# Patient Record
Sex: MALE | Race: WHITE | Employment: OTHER | URBAN - METROPOLITAN AREA
[De-identification: names, ages, dates, MRNs, and addresses within clinical notes are randomized per-mention and may not be internally consistent; named-entity substitution may affect disease eponyms.]

---

## 2017-02-09 ENCOUNTER — HOSPITAL ENCOUNTER (OUTPATIENT)
Dept: MRI IMAGING | Age: 72
Discharge: HOME | End: 2017-02-09
Attending: NEUROLOGICAL SURGERY
Payer: MEDICARE

## 2017-02-09 DIAGNOSIS — G95.9 CERVICAL MYELOPATHY WITH CERVICAL RADICULOPATHY (HCC): ICD-10-CM

## 2017-02-09 DIAGNOSIS — M54.12 CERVICAL MYELOPATHY WITH CERVICAL RADICULOPATHY (HCC): ICD-10-CM

## 2017-02-09 PROCEDURE — 72141 MRI NECK SPINE W/O DYE: CPT

## 2017-02-13 ENCOUNTER — HOSPITAL ENCOUNTER (OUTPATIENT)
Dept: GENERAL RADIOLOGY | Age: 72
Discharge: HOME | End: 2017-02-13
Payer: MEDICARE

## 2017-02-13 DIAGNOSIS — M06.09 RHEUMATOID ARTHRITIS OF MULTIPLE SITES WITHOUT RHEUMATOID FACTOR (HCC): ICD-10-CM

## 2017-02-13 PROCEDURE — 73130 X-RAY EXAM OF HAND: CPT

## 2018-02-21 ENCOUNTER — HOSPITAL ENCOUNTER (OUTPATIENT)
Dept: CT IMAGING | Age: 73
Discharge: HOME | End: 2018-02-21
Attending: OTOLARYNGOLOGY
Payer: MEDICARE

## 2018-02-21 DIAGNOSIS — J32.9 CHRONIC SINUSITIS: ICD-10-CM

## 2018-02-21 PROCEDURE — 70486 CT MAXILLOFACIAL W/O DYE: CPT

## 2019-03-15 ENCOUNTER — HOSPITAL ENCOUNTER (OUTPATIENT)
Dept: CT IMAGING | Age: 74
Discharge: HOME | End: 2019-03-15
Attending: OPHTHALMOLOGY
Payer: MEDICARE

## 2019-03-15 DIAGNOSIS — H05.89 ORBITAL MASS: ICD-10-CM

## 2019-03-15 LAB — CREAT BLD-MCNC: 0.8 MG/DL (ref 0.6–1.3)

## 2019-03-15 PROCEDURE — 70482 CT ORBIT/EAR/FOSSA W/O&W/DYE: CPT

## 2019-03-15 PROCEDURE — 74011636320 HC RX REV CODE- 636/320: Performed by: RADIOLOGY

## 2019-03-15 PROCEDURE — 82565 ASSAY OF CREATININE: CPT

## 2019-03-15 RX ORDER — SODIUM CHLORIDE 0.9 % (FLUSH) 0.9 %
10 SYRINGE (ML) INJECTION
Status: COMPLETED | OUTPATIENT
Start: 2019-03-15 | End: 2019-03-15

## 2019-03-15 RX ADMIN — IOPAMIDOL 100 ML: 755 INJECTION, SOLUTION INTRAVENOUS at 13:23

## 2019-03-15 RX ADMIN — Medication 10 ML: at 13:23

## 2020-07-14 ENCOUNTER — APPOINTMENT (OUTPATIENT)
Dept: GENERAL RADIOLOGY | Age: 75
DRG: 310 | End: 2020-07-14
Attending: EMERGENCY MEDICINE
Payer: MEDICARE

## 2020-07-14 ENCOUNTER — APPOINTMENT (OUTPATIENT)
Dept: CT IMAGING | Age: 75
DRG: 310 | End: 2020-07-14
Attending: EMERGENCY MEDICINE
Payer: MEDICARE

## 2020-07-14 ENCOUNTER — HOSPITAL ENCOUNTER (INPATIENT)
Age: 75
LOS: 1 days | Discharge: HOME | DRG: 310 | End: 2020-07-17
Attending: EMERGENCY MEDICINE | Admitting: HOSPITALIST
Payer: MEDICARE

## 2020-07-14 DIAGNOSIS — I48.91 ATRIAL FIBRILLATION, UNSPECIFIED TYPE (HCC): ICD-10-CM

## 2020-07-14 DIAGNOSIS — I95.9 HYPOTENSION, UNSPECIFIED HYPOTENSION TYPE: ICD-10-CM

## 2020-07-14 DIAGNOSIS — F10.929 ALCOHOLIC INTOXICATION WITH COMPLICATION (HCC): ICD-10-CM

## 2020-07-14 DIAGNOSIS — E86.0 DEHYDRATION: ICD-10-CM

## 2020-07-14 DIAGNOSIS — R55 SYNCOPE AND COLLAPSE: Primary | ICD-10-CM

## 2020-07-14 LAB
ALBUMIN SERPL-MCNC: 2.5 G/DL (ref 3.5–5)
ALBUMIN/GLOB SERPL: 1 {RATIO} (ref 1.1–2.2)
ALP SERPL-CCNC: 80 U/L (ref 45–117)
ALT SERPL-CCNC: 19 U/L (ref 12–78)
ANION GAP SERPL CALC-SCNC: 8 MMOL/L (ref 5–15)
APTT PPP: 28.8 SEC (ref 22.1–32)
AST SERPL-CCNC: 14 U/L (ref 15–37)
BASOPHILS # BLD: 0 K/UL (ref 0–0.1)
BASOPHILS NFR BLD: 1 % (ref 0–1)
BILIRUB SERPL-MCNC: 0.2 MG/DL (ref 0.2–1)
BUN SERPL-MCNC: 15 MG/DL (ref 6–20)
BUN/CREAT SERPL: 20 (ref 12–20)
CALCIUM SERPL-MCNC: 6.9 MG/DL (ref 8.5–10.1)
CHLORIDE SERPL-SCNC: 109 MMOL/L (ref 97–108)
CK MB CFR SERPL CALC: 2.1 % (ref 0–2.5)
CK MB SERPL-MCNC: 1.2 NG/ML (ref 5–25)
CK SERPL-CCNC: 58 U/L (ref 39–308)
CO2 SERPL-SCNC: 23 MMOL/L (ref 21–32)
COMMENT, HOLDF: NORMAL
CREAT SERPL-MCNC: 0.74 MG/DL (ref 0.7–1.3)
DIFFERENTIAL METHOD BLD: ABNORMAL
EOSINOPHIL # BLD: 0.1 K/UL (ref 0–0.4)
EOSINOPHIL NFR BLD: 2 % (ref 0–7)
ERYTHROCYTE [DISTWIDTH] IN BLOOD BY AUTOMATED COUNT: 11.8 % (ref 11.5–14.5)
ETHANOL SERPL-MCNC: 89 MG/DL
GLOBULIN SER CALC-MCNC: 2.6 G/DL (ref 2–4)
GLUCOSE SERPL-MCNC: 90 MG/DL (ref 65–100)
HCT VFR BLD AUTO: 34.1 % (ref 36.6–50.3)
HGB BLD-MCNC: 11.8 G/DL (ref 12.1–17)
IMM GRANULOCYTES # BLD AUTO: 0 K/UL (ref 0–0.04)
IMM GRANULOCYTES NFR BLD AUTO: 0 % (ref 0–0.5)
INR PPP: 1.2 (ref 0.9–1.1)
LYMPHOCYTES # BLD: 1.3 K/UL (ref 0.8–3.5)
LYMPHOCYTES NFR BLD: 26 % (ref 12–49)
MAGNESIUM SERPL-MCNC: 1.6 MG/DL (ref 1.6–2.4)
MCH RBC QN AUTO: 33 PG (ref 26–34)
MCHC RBC AUTO-ENTMCNC: 34.6 G/DL (ref 30–36.5)
MCV RBC AUTO: 95.3 FL (ref 80–99)
MONOCYTES # BLD: 0.5 K/UL (ref 0–1)
MONOCYTES NFR BLD: 10 % (ref 5–13)
NEUTS SEG # BLD: 3 K/UL (ref 1.8–8)
NEUTS SEG NFR BLD: 61 % (ref 32–75)
NRBC # BLD: 0 K/UL (ref 0–0.01)
NRBC BLD-RTO: 0 PER 100 WBC
PLATELET # BLD AUTO: 173 K/UL (ref 150–400)
PMV BLD AUTO: 10.4 FL (ref 8.9–12.9)
POTASSIUM SERPL-SCNC: 3.2 MMOL/L (ref 3.5–5.1)
PROT SERPL-MCNC: 5.1 G/DL (ref 6.4–8.2)
PROTHROMBIN TIME: 11.9 SEC (ref 9–11.1)
RBC # BLD AUTO: 3.58 M/UL (ref 4.1–5.7)
SAMPLES BEING HELD,HOLD: NORMAL
SODIUM SERPL-SCNC: 140 MMOL/L (ref 136–145)
THERAPEUTIC RANGE,PTTT: NORMAL SECS (ref 58–77)
TROPONIN I SERPL-MCNC: <0.05 NG/ML
TSH SERPL DL<=0.05 MIU/L-ACNC: 3.26 UIU/ML (ref 0.36–3.74)
WBC # BLD AUTO: 5 K/UL (ref 4.1–11.1)

## 2020-07-14 PROCEDURE — 96361 HYDRATE IV INFUSION ADD-ON: CPT

## 2020-07-14 PROCEDURE — 84484 ASSAY OF TROPONIN QUANT: CPT

## 2020-07-14 PROCEDURE — 85730 THROMBOPLASTIN TIME PARTIAL: CPT

## 2020-07-14 PROCEDURE — 80053 COMPREHEN METABOLIC PANEL: CPT

## 2020-07-14 PROCEDURE — 96374 THER/PROPH/DIAG INJ IV PUSH: CPT

## 2020-07-14 PROCEDURE — 85025 COMPLETE CBC W/AUTO DIFF WBC: CPT

## 2020-07-14 PROCEDURE — 70450 CT HEAD/BRAIN W/O DYE: CPT

## 2020-07-14 PROCEDURE — 36415 COLL VENOUS BLD VENIPUNCTURE: CPT

## 2020-07-14 PROCEDURE — 74011250637 HC RX REV CODE- 250/637: Performed by: EMERGENCY MEDICINE

## 2020-07-14 PROCEDURE — 85610 PROTHROMBIN TIME: CPT

## 2020-07-14 PROCEDURE — 83735 ASSAY OF MAGNESIUM: CPT

## 2020-07-14 PROCEDURE — 99285 EMERGENCY DEPT VISIT HI MDM: CPT

## 2020-07-14 PROCEDURE — 80307 DRUG TEST PRSMV CHEM ANLYZR: CPT

## 2020-07-14 PROCEDURE — 84443 ASSAY THYROID STIM HORMONE: CPT

## 2020-07-14 PROCEDURE — 93005 ELECTROCARDIOGRAM TRACING: CPT

## 2020-07-14 PROCEDURE — 71045 X-RAY EXAM CHEST 1 VIEW: CPT

## 2020-07-14 PROCEDURE — 82550 ASSAY OF CK (CPK): CPT

## 2020-07-14 PROCEDURE — 74011250636 HC RX REV CODE- 250/636: Performed by: EMERGENCY MEDICINE

## 2020-07-14 RX ORDER — LANOLIN ALCOHOL/MO/W.PET/CERES
1 CREAM (GRAM) TOPICAL DAILY
COMMUNITY

## 2020-07-14 RX ORDER — POTASSIUM CHLORIDE 750 MG/1
40 TABLET, FILM COATED, EXTENDED RELEASE ORAL
Status: COMPLETED | OUTPATIENT
Start: 2020-07-14 | End: 2020-07-14

## 2020-07-14 RX ADMIN — SODIUM CHLORIDE 1000 ML: 900 INJECTION, SOLUTION INTRAVENOUS at 23:10

## 2020-07-14 RX ADMIN — POTASSIUM CHLORIDE 40 MEQ: 750 TABLET, FILM COATED, EXTENDED RELEASE ORAL at 23:50

## 2020-07-15 ENCOUNTER — APPOINTMENT (OUTPATIENT)
Dept: VASCULAR SURGERY | Age: 75
DRG: 310 | End: 2020-07-15
Attending: INTERNAL MEDICINE
Payer: MEDICARE

## 2020-07-15 ENCOUNTER — APPOINTMENT (OUTPATIENT)
Dept: NON INVASIVE DIAGNOSTICS | Age: 75
DRG: 310 | End: 2020-07-15
Attending: INTERNAL MEDICINE
Payer: MEDICARE

## 2020-07-15 PROBLEM — I65.23 BILATERAL CAROTID ARTERY STENOSIS: Status: ACTIVE | Noted: 2020-07-15

## 2020-07-15 PROBLEM — I48.91 ATRIAL FIBRILLATION (HCC): Status: ACTIVE | Noted: 2020-07-15

## 2020-07-15 PROBLEM — R55 SYNCOPE AND COLLAPSE: Status: ACTIVE | Noted: 2020-07-15

## 2020-07-15 LAB
ALBUMIN SERPL-MCNC: 2.9 G/DL (ref 3.5–5)
ALBUMIN/GLOB SERPL: 1 {RATIO} (ref 1.1–2.2)
ALP SERPL-CCNC: 88 U/L (ref 45–117)
ALT SERPL-CCNC: 20 U/L (ref 12–78)
ANION GAP SERPL CALC-SCNC: 6 MMOL/L (ref 5–15)
APPEARANCE UR: CLEAR
AST SERPL-CCNC: 20 U/L (ref 15–37)
BACTERIA URNS QL MICRO: NEGATIVE /HPF
BILIRUB SERPL-MCNC: 0.3 MG/DL (ref 0.2–1)
BILIRUB UR QL: NEGATIVE
BUN SERPL-MCNC: 13 MG/DL (ref 6–20)
BUN/CREAT SERPL: 20 (ref 12–20)
CALCIUM SERPL-MCNC: 7.4 MG/DL (ref 8.5–10.1)
CHLORIDE SERPL-SCNC: 110 MMOL/L (ref 97–108)
CK MB CFR SERPL CALC: 1.6 % (ref 0–2.5)
CK MB SERPL-MCNC: 1.2 NG/ML (ref 5–25)
CK SERPL-CCNC: 77 U/L (ref 39–308)
CO2 SERPL-SCNC: 23 MMOL/L (ref 21–32)
COLOR UR: NORMAL
CREAT SERPL-MCNC: 0.66 MG/DL (ref 0.7–1.3)
ECHO AO ROOT DIAM: 3.43 CM
ECHO EST RA PRESSURE: 10 MMHG
ECHO LA AREA 4C: 18.49 CM2
ECHO LA MAJOR AXIS: 3.57 CM
ECHO LA MINOR AXIS: 1.74 CM
ECHO LA VOL 2C: 86.24 ML (ref 18–58)
ECHO LA VOL 4C: 47.93 ML (ref 18–58)
ECHO LA VOL BP: 71.48 ML (ref 18–58)
ECHO LA VOL/BSA BIPLANE: 34.81 ML/M2 (ref 16–28)
ECHO LA VOLUME INDEX A2C: 41.99 ML/M2 (ref 16–28)
ECHO LA VOLUME INDEX A4C: 23.34 ML/M2 (ref 16–28)
ECHO LV INTERNAL DIMENSION DIASTOLIC: 3.38 CM (ref 4.2–5.9)
ECHO LV INTERNAL DIMENSION SYSTOLIC: 2.62 CM
ECHO LV IVSD: 2.13 CM (ref 0.6–1)
ECHO LV IVSS: 2 CM
ECHO LV MASS 2D: 255.1 G (ref 88–224)
ECHO LV MASS INDEX 2D: 124.2 G/M2 (ref 49–115)
ECHO LV POSTERIOR WALL DIASTOLIC: 1.5 CM (ref 0.6–1)
ECHO LV POSTERIOR WALL SYSTOLIC: 2.3 CM
ECHO LVOT DIAM: 2.13 CM
ECHO MV A VELOCITY: 0.48 CM/S
ECHO MV AREA PHT: 5.13 CM2
ECHO MV E DECELERATION TIME (DT): 0.17 S
ECHO MV E VELOCITY: 76.08 CM/S
ECHO MV E/A RATIO: 158.5
ECHO MV PRESSURE HALF TIME (PHT): 0.04 S
ECHO PV MAX VELOCITY: 83.56 CM/S
ECHO PV PEAK INSTANTANEOUS GRADIENT SYSTOLIC: 2.82 MMHG
ECHO RV INTERNAL DIMENSION: 2.45 CM
EPITH CASTS URNS QL MICRO: NORMAL /LPF
ERYTHROCYTE [DISTWIDTH] IN BLOOD BY AUTOMATED COUNT: 11.9 % (ref 11.5–14.5)
GLOBULIN SER CALC-MCNC: 3 G/DL (ref 2–4)
GLUCOSE SERPL-MCNC: 93 MG/DL (ref 65–100)
GLUCOSE UR STRIP.AUTO-MCNC: NEGATIVE MG/DL
HCT VFR BLD AUTO: 37.3 % (ref 36.6–50.3)
HGB BLD-MCNC: 12.7 G/DL (ref 12.1–17)
HGB UR QL STRIP: NEGATIVE
HYALINE CASTS URNS QL MICRO: NORMAL /LPF (ref 0–5)
KETONES UR QL STRIP.AUTO: NEGATIVE MG/DL
LEFT CCA DIST DIAS: 19.5 CM/S
LEFT CCA DIST SYS: 57.6 CM/S
LEFT CCA PROX DIAS: 23 CM/S
LEFT CCA PROX SYS: 94.6 CM/S
LEFT ECA DIAS: 10.3 CM/S
LEFT ECA SYS: 69.4 CM/S
LEFT ICA DIST DIAS: 26 CM/S
LEFT ICA DIST SYS: 56.9 CM/S
LEFT ICA MID DIAS: 20.1 CM/S
LEFT ICA MID SYS: 47.8 CM/S
LEFT ICA PROX DIAS: 16.3 CM/S
LEFT ICA PROX SYS: 43.7 CM/S
LEFT ICA/CCA SYS: 1
LEFT SUBCLAVIAN DIAS: 0 CM/S
LEFT SUBCLAVIAN SYS: 91.5 CM/S
LEFT VERTEBRAL DIAS: 20 CM/S
LEFT VERTEBRAL SYS: 39.2 CM/S
LEUKOCYTE ESTERASE UR QL STRIP.AUTO: NEGATIVE
MAGNESIUM SERPL-MCNC: 2 MG/DL (ref 1.6–2.4)
MCH RBC QN AUTO: 32.6 PG (ref 26–34)
MCHC RBC AUTO-ENTMCNC: 34 G/DL (ref 30–36.5)
MCV RBC AUTO: 95.9 FL (ref 80–99)
NITRITE UR QL STRIP.AUTO: NEGATIVE
NRBC # BLD: 0 K/UL (ref 0–0.01)
NRBC BLD-RTO: 0 PER 100 WBC
PH UR STRIP: 6.5 [PH] (ref 5–8)
PLATELET # BLD AUTO: 187 K/UL (ref 150–400)
PMV BLD AUTO: 10.7 FL (ref 8.9–12.9)
POTASSIUM SERPL-SCNC: 4.3 MMOL/L (ref 3.5–5.1)
PROT SERPL-MCNC: 5.9 G/DL (ref 6.4–8.2)
PROT UR STRIP-MCNC: NEGATIVE MG/DL
RBC # BLD AUTO: 3.89 M/UL (ref 4.1–5.7)
RBC #/AREA URNS HPF: NORMAL /HPF (ref 0–5)
RIGHT CCA DIST DIAS: 29.2 CM/S
RIGHT CCA DIST SYS: 78.2 CM/S
RIGHT CCA PROX DIAS: 23.5 CM/S
RIGHT CCA PROX SYS: 72.2 CM/S
RIGHT ECA DIAS: 19 CM/S
RIGHT ECA SYS: 64.3 CM/S
RIGHT ICA DIST DIAS: 22.4 CM/S
RIGHT ICA DIST SYS: 49.6 CM/S
RIGHT ICA MID DIAS: 34.3 CM/S
RIGHT ICA MID SYS: 69.9 CM/S
RIGHT ICA PROX DIAS: 17.3 CM/S
RIGHT ICA PROX SYS: 34.8 CM/S
RIGHT ICA/CCA SYS: 0.9
RIGHT SUBCLAVIAN DIAS: 0 CM/S
RIGHT SUBCLAVIAN SYS: 53.5 CM/S
RIGHT VERTEBRAL DIAS: 23.5 CM/S
RIGHT VERTEBRAL SYS: 50 CM/S
SODIUM SERPL-SCNC: 139 MMOL/L (ref 136–145)
SP GR UR REFRACTOMETRY: 1.01 (ref 1–1.03)
TROPONIN I SERPL-MCNC: <0.05 NG/ML
UA: UC IF INDICATED,UAUC: NORMAL
UROBILINOGEN UR QL STRIP.AUTO: 0.2 EU/DL (ref 0.2–1)
WBC # BLD AUTO: 7.2 K/UL (ref 4.1–11.1)
WBC URNS QL MICRO: NORMAL /HPF (ref 0–4)

## 2020-07-15 PROCEDURE — 96372 THER/PROPH/DIAG INJ SC/IM: CPT

## 2020-07-15 PROCEDURE — 80053 COMPREHEN METABOLIC PANEL: CPT

## 2020-07-15 PROCEDURE — 96375 TX/PRO/DX INJ NEW DRUG ADDON: CPT

## 2020-07-15 PROCEDURE — 93306 TTE W/DOPPLER COMPLETE: CPT

## 2020-07-15 PROCEDURE — 93880 EXTRACRANIAL BILAT STUDY: CPT

## 2020-07-15 PROCEDURE — 74011250637 HC RX REV CODE- 250/637: Performed by: HOSPITALIST

## 2020-07-15 PROCEDURE — 99218 HC RM OBSERVATION: CPT

## 2020-07-15 PROCEDURE — 96361 HYDRATE IV INFUSION ADD-ON: CPT

## 2020-07-15 PROCEDURE — 84484 ASSAY OF TROPONIN QUANT: CPT

## 2020-07-15 PROCEDURE — 74011250636 HC RX REV CODE- 250/636: Performed by: INTERNAL MEDICINE

## 2020-07-15 PROCEDURE — 74011250636 HC RX REV CODE- 250/636: Performed by: EMERGENCY MEDICINE

## 2020-07-15 PROCEDURE — 81001 URINALYSIS AUTO W/SCOPE: CPT

## 2020-07-15 PROCEDURE — 96374 THER/PROPH/DIAG INJ IV PUSH: CPT

## 2020-07-15 PROCEDURE — 36415 COLL VENOUS BLD VENIPUNCTURE: CPT

## 2020-07-15 PROCEDURE — 82553 CREATINE MB FRACTION: CPT

## 2020-07-15 PROCEDURE — 85027 COMPLETE CBC AUTOMATED: CPT

## 2020-07-15 PROCEDURE — 74011000258 HC RX REV CODE- 258: Performed by: INTERNAL MEDICINE

## 2020-07-15 PROCEDURE — 83735 ASSAY OF MAGNESIUM: CPT

## 2020-07-15 PROCEDURE — 74011250637 HC RX REV CODE- 250/637: Performed by: INTERNAL MEDICINE

## 2020-07-15 RX ORDER — MAGNESIUM SULFATE 1 G/100ML
1 INJECTION INTRAVENOUS
Status: COMPLETED | OUTPATIENT
Start: 2020-07-15 | End: 2020-07-15

## 2020-07-15 RX ORDER — THERA TABS 400 MCG
1 TAB ORAL DAILY
Status: DISCONTINUED | OUTPATIENT
Start: 2020-07-15 | End: 2020-07-17 | Stop reason: HOSPADM

## 2020-07-15 RX ORDER — ASPIRIN 325 MG/1
100 TABLET, FILM COATED ORAL DAILY
Status: DISCONTINUED | OUTPATIENT
Start: 2020-07-15 | End: 2020-07-17 | Stop reason: HOSPADM

## 2020-07-15 RX ORDER — ENOXAPARIN SODIUM 100 MG/ML
40 INJECTION SUBCUTANEOUS EVERY 24 HOURS
Status: DISCONTINUED | OUTPATIENT
Start: 2020-07-15 | End: 2020-07-15

## 2020-07-15 RX ORDER — DILTIAZEM HYDROCHLORIDE 30 MG/1
30 TABLET, FILM COATED ORAL EVERY 6 HOURS
Status: DISCONTINUED | OUTPATIENT
Start: 2020-07-15 | End: 2020-07-16

## 2020-07-15 RX ORDER — ENOXAPARIN SODIUM 100 MG/ML
90 INJECTION SUBCUTANEOUS EVERY 12 HOURS
Status: DISCONTINUED | OUTPATIENT
Start: 2020-07-15 | End: 2020-07-16

## 2020-07-15 RX ORDER — PANTOPRAZOLE SODIUM 40 MG/1
40 TABLET, DELAYED RELEASE ORAL
Status: DISCONTINUED | OUTPATIENT
Start: 2020-07-15 | End: 2020-07-17 | Stop reason: HOSPADM

## 2020-07-15 RX ORDER — SODIUM CHLORIDE 0.9 % (FLUSH) 0.9 %
5-40 SYRINGE (ML) INJECTION AS NEEDED
Status: DISCONTINUED | OUTPATIENT
Start: 2020-07-15 | End: 2020-07-17 | Stop reason: HOSPADM

## 2020-07-15 RX ORDER — HYDROXYCHLOROQUINE SULFATE 200 MG/1
400 TABLET, FILM COATED ORAL EVERY EVENING
Status: DISCONTINUED | OUTPATIENT
Start: 2020-07-15 | End: 2020-07-17 | Stop reason: HOSPADM

## 2020-07-15 RX ORDER — DORZOLAMIDE HYDROCHLORIDE AND TIMOLOL MALEATE 20; 5 MG/ML; MG/ML
1 SOLUTION/ DROPS OPHTHALMIC 2 TIMES DAILY
Status: DISCONTINUED | OUTPATIENT
Start: 2020-07-15 | End: 2020-07-17 | Stop reason: HOSPADM

## 2020-07-15 RX ORDER — ENOXAPARIN SODIUM 100 MG/ML
90 INJECTION SUBCUTANEOUS EVERY 12 HOURS
Status: DISCONTINUED | OUTPATIENT
Start: 2020-07-15 | End: 2020-07-15

## 2020-07-15 RX ORDER — FOLIC ACID 1 MG/1
1 TABLET ORAL DAILY
Status: DISCONTINUED | OUTPATIENT
Start: 2020-07-15 | End: 2020-07-17 | Stop reason: HOSPADM

## 2020-07-15 RX ORDER — LORAZEPAM 2 MG/ML
2 INJECTION INTRAMUSCULAR
Status: DISCONTINUED | OUTPATIENT
Start: 2020-07-15 | End: 2020-07-17 | Stop reason: HOSPADM

## 2020-07-15 RX ORDER — LATANOPROST 50 UG/ML
1 SOLUTION/ DROPS OPHTHALMIC EVERY EVENING
Status: DISCONTINUED | OUTPATIENT
Start: 2020-07-15 | End: 2020-07-17 | Stop reason: HOSPADM

## 2020-07-15 RX ORDER — SODIUM CHLORIDE 9 MG/ML
125 INJECTION, SOLUTION INTRAVENOUS CONTINUOUS
Status: DISCONTINUED | OUTPATIENT
Start: 2020-07-15 | End: 2020-07-15

## 2020-07-15 RX ORDER — SODIUM CHLORIDE 0.9 % (FLUSH) 0.9 %
5-40 SYRINGE (ML) INJECTION EVERY 8 HOURS
Status: DISCONTINUED | OUTPATIENT
Start: 2020-07-15 | End: 2020-07-17 | Stop reason: HOSPADM

## 2020-07-15 RX ORDER — HYDROXYCHLOROQUINE SULFATE 200 MG/1
400 TABLET, FILM COATED ORAL DAILY
COMMUNITY

## 2020-07-15 RX ORDER — LORAZEPAM 2 MG/ML
4 INJECTION INTRAMUSCULAR
Status: DISCONTINUED | OUTPATIENT
Start: 2020-07-15 | End: 2020-07-17 | Stop reason: HOSPADM

## 2020-07-15 RX ORDER — SODIUM CHLORIDE AND POTASSIUM CHLORIDE .9; .15 G/100ML; G/100ML
SOLUTION INTRAVENOUS CONTINUOUS
Status: DISCONTINUED | OUTPATIENT
Start: 2020-07-15 | End: 2020-07-15

## 2020-07-15 RX ORDER — ENOXAPARIN SODIUM 100 MG/ML
90 INJECTION SUBCUTANEOUS
Status: COMPLETED | OUTPATIENT
Start: 2020-07-15 | End: 2020-07-15

## 2020-07-15 RX ORDER — ACETAMINOPHEN 325 MG/1
650 TABLET ORAL
Status: DISCONTINUED | OUTPATIENT
Start: 2020-07-15 | End: 2020-07-17 | Stop reason: HOSPADM

## 2020-07-15 RX ADMIN — THERA TABS 1 TABLET: TAB at 11:14

## 2020-07-15 RX ADMIN — FOLIC ACID 1 MG: 1 TABLET ORAL at 08:27

## 2020-07-15 RX ADMIN — Medication 10 ML: at 15:33

## 2020-07-15 RX ADMIN — MAGNESIUM SULFATE HEPTAHYDRATE 1 G: 1 INJECTION, SOLUTION INTRAVENOUS at 01:22

## 2020-07-15 RX ADMIN — Medication 10 ML: at 04:02

## 2020-07-15 RX ADMIN — PANTOPRAZOLE SODIUM 40 MG: 40 TABLET, DELAYED RELEASE ORAL at 08:27

## 2020-07-15 RX ADMIN — Medication 10 ML: at 22:00

## 2020-07-15 RX ADMIN — DILTIAZEM HYDROCHLORIDE 30 MG: 30 TABLET, FILM COATED ORAL at 22:00

## 2020-07-15 RX ADMIN — POTASSIUM CHLORIDE AND SODIUM CHLORIDE: 900; 150 INJECTION, SOLUTION INTRAVENOUS at 02:32

## 2020-07-15 RX ADMIN — THIAMINE HCL TAB 100 MG 100 MG: 100 TAB at 11:14

## 2020-07-15 RX ADMIN — LATANOPROST 1 DROP: 50 SOLUTION/ DROPS OPHTHALMIC at 17:45

## 2020-07-15 RX ADMIN — HYDROXYCHLOROQUINE SULFATE 400 MG: 200 TABLET ORAL at 17:45

## 2020-07-15 RX ADMIN — PANTOPRAZOLE SODIUM 40 MG: 40 TABLET, DELAYED RELEASE ORAL at 02:38

## 2020-07-15 RX ADMIN — ENOXAPARIN SODIUM 90 MG: 100 INJECTION SUBCUTANEOUS at 04:01

## 2020-07-15 RX ADMIN — DILTIAZEM HYDROCHLORIDE 30 MG: 30 TABLET, FILM COATED ORAL at 17:44

## 2020-07-15 RX ADMIN — THIAMINE HYDROCHLORIDE 100 MG: 100 INJECTION, SOLUTION INTRAMUSCULAR; INTRAVENOUS at 09:52

## 2020-07-15 RX ADMIN — ENOXAPARIN SODIUM 90 MG: 100 INJECTION SUBCUTANEOUS at 15:33

## 2020-07-15 RX ADMIN — DORZOLAMIDE HYDROCHLORIDE AND TIMOLOL MALEATE 1 DROP: 20; 5 SOLUTION/ DROPS OPHTHALMIC at 17:45

## 2020-07-15 RX ADMIN — DILTIAZEM HYDROCHLORIDE 30 MG: 30 TABLET, FILM COATED ORAL at 11:14

## 2020-07-15 NOTE — ED PROVIDER NOTES
EMERGENCY DEPARTMENT HISTORY AND PHYSICAL EXAM      Date: 7/14/2020  Patient Name: Velvet Mccall    History of Presenting Illness     Chief Complaint   Patient presents with    Syncope     EMS reports pt with syncopal episode tonight while eating dinner. hypotensive en route.  Vomiting       History Provided By: Patient and Patient's Wife    HPI: Velvet Mccall, 69 y.o. male with PMHx significant for hypertension, rheumatoid arthritis, GERD, glaucoma, and prostate cancer presents to the ED with a chief complaint of a syncopal episode tonight. Patient reports that he was feeling well through the day, although he had been stressed about an upcoming meeting tomorrow. Tonight he ate dinner and drank several alcoholic drinks (4-5). After dinner his wife and he were watching TV and \"talking politics\". He suddenly became dizzy and felt disoriented. His wife says that he slumped over for proximately 2 to 5 minutes and was not responding to her. He then became more responsive, but was mumbling and she was having a difficult time understanding his speech. Patient states at that time he started feeling like he was sweating profusely and became more dizzy. He describes his dizziness as room spinning and not lightheaded feeling. He developed nausea and several episodes of vomiting. EMS was called and found patient to be in A. fib with blood pressures that were in the 16H to 35P systolic. He was given 2 L of normal saline and 4 mg of Zofran in route. Patient denies any fall or head injury. He states he was sitting in his chair the whole time that these episodes were occurring. He denies any numbness, tingling, weakness, fever, chills, chest pain, shortness of breath. He reports a cough, but attributes it to his 1 pack/day smoking. Patient admits that he drinks 4-5 drinks every night. He denies any dysuria or hematuria, but does report urinary frequency at nighttime.   He has never had a similar episode and does not have a history of atrial fibrillation. He states that right now his dizziness is improved from when he was at home. PCP: Suraj Prakash MD    No current facility-administered medications on file prior to encounter. Current Outpatient Medications on File Prior to Encounter   Medication Sig Dispense Refill    LISINOPRIL PO Take  by mouth.  amlodipine besylate (AMLODIPINE PO) Take  by mouth.  glucosamine-chondroitin (ARTHX) 500-400 mg cap Take 1 Cap by mouth daily.  hydroxychloroquine sulfate (HYDROXYCHLOROQUINE PO) Take  by mouth. Past History     Past Medical History:  Past Medical History:   Diagnosis Date    Arthritis     Hypertension     Ill-defined condition     glaucoma-left eye       Past Surgical History:  History reviewed. No pertinent surgical history. Family History:  History reviewed. No pertinent family history. Social History:  Social History     Tobacco Use    Smoking status: Current Every Day Smoker     Packs/day: 1.00    Smokeless tobacco: Never Used   Substance Use Topics    Alcohol use: Yes    Drug use: Not Currently       Allergies:  No Known Allergies      Review of Systems   Review of Systems   Constitutional: Positive for fatigue. Negative for chills and fever. HENT: Negative for congestion, ear pain, sinus pain and sore throat. Eyes: Negative. Respiratory: Positive for cough. Negative for chest tightness, shortness of breath and wheezing. Cardiovascular: Negative for chest pain, palpitations and leg swelling. Gastrointestinal: Positive for nausea and vomiting. Negative for abdominal pain, constipation and diarrhea. Genitourinary: Positive for frequency. Negative for dysuria, flank pain and hematuria. Difficulty urinating: At nighttime. Musculoskeletal: Negative for back pain, myalgias and neck pain. Skin: Negative for rash and wound. Neurological: Positive for dizziness, syncope and weakness.  Negative for seizures, light-headedness, numbness and headaches. Psychiatric/Behavioral: Negative for confusion. The patient is not nervous/anxious. All other systems reviewed and are negative. Physical Exam    General appearance - well nourished, well appearing, and in no distress  Eyes - pupils equal and reactive, extraocular eye movements intact  ENT - mucous membranes moist, pharynx normal without lesions  Neck - supple, no significant adenopathy; non-tender to palpation  Chest - clear to auscultation, no wheezes, rales or rhonchi; non-tender to palpation  Heart -irregularly irregular rhythm, normal rate, no murmurs noted  Abdomen - soft, nontender, nondistended, no masses or organomegaly  Musculoskeletal - no joint tenderness, deformity or swelling; normal ROM  Extremities - peripheral pulses normal, no pedal edema  Skin - normal coloration and turgor, no rashes  Neurological - alert, oriented x3, normal speech, no focal findings or movement disorder noted    Diagnostic Study Results     Labs -     Recent Results (from the past 12 hour(s))   CBC WITH AUTOMATED DIFF    Collection Time: 07/14/20 10:00 PM   Result Value Ref Range    WBC 5.0 4.1 - 11.1 K/uL    RBC 3.58 (L) 4.10 - 5.70 M/uL    HGB 11.8 (L) 12.1 - 17.0 g/dL    HCT 34.1 (L) 36.6 - 50.3 %    MCV 95.3 80.0 - 99.0 FL    MCH 33.0 26.0 - 34.0 PG    MCHC 34.6 30.0 - 36.5 g/dL    RDW 11.8 11.5 - 14.5 %    PLATELET 665 048 - 032 K/uL    MPV 10.4 8.9 - 12.9 FL    NRBC 0.0 0  WBC    ABSOLUTE NRBC 0.00 0.00 - 0.01 K/uL    NEUTROPHILS 61 32 - 75 %    LYMPHOCYTES 26 12 - 49 %    MONOCYTES 10 5 - 13 %    EOSINOPHILS 2 0 - 7 %    BASOPHILS 1 0 - 1 %    IMMATURE GRANULOCYTES 0 0.0 - 0.5 %    ABS. NEUTROPHILS 3.0 1.8 - 8.0 K/UL    ABS. LYMPHOCYTES 1.3 0.8 - 3.5 K/UL    ABS. MONOCYTES 0.5 0.0 - 1.0 K/UL    ABS. EOSINOPHILS 0.1 0.0 - 0.4 K/UL    ABS. BASOPHILS 0.0 0.0 - 0.1 K/UL    ABS. IMM.  GRANS. 0.0 0.00 - 0.04 K/UL    DF AUTOMATED     SAMPLES BEING HELD    Collection Time: 07/14/20 10:00 PM   Result Value Ref Range    SAMPLES BEING HELD  ROMULO     COMMENT        Add-on orders for these samples will be processed based on acceptable specimen integrity and analyte stability, which may vary by analyte. EKG, 12 LEAD, INITIAL    Collection Time: 07/14/20 10:00 PM   Result Value Ref Range    Ventricular Rate 75 BPM    Atrial Rate 234 BPM    QRS Duration 70 ms    Q-T Interval 408 ms    QTC Calculation (Bezet) 455 ms    Calculated R Axis -18 degrees    Calculated T Axis -20 degrees    Diagnosis       Atrial fibrillation  Minimal voltage criteria for LVH, may be normal variant  Inferior infarct , age undetermined  When compared with ECG of 20-OCT-2009 14:12,  Previous ECG has undetermined rhythm, needs review  Inferior infarct is now present  T wave amplitude has decreased in Lateral leads         Radiologic Studies -   No orders to display     CT Results  (Last 48 hours)    None        CXR Results  (Last 48 hours)    None            Medical Decision Making   I am the first provider for this patient. I reviewed the vital signs, available nursing notes, past medical history, past surgical history, family history and social history. Vital Signs-Reviewed the patient's vital signs. Patient Vitals for the past 12 hrs:   Temp Pulse Resp BP SpO2   07/14/20 2200 97.3 °F (36.3 °C) 96 18 (!) 81/60 97 %       EKG at 10:00 on July 14, 2020 interpreted by me: Atrial fibrillation, 75 bpm, normal axis, normal QRS and QTc intervals, nonspecific ST changes    Records Reviewed: Nursing Notes and Old Medical Records    Provider Notes (Medical Decision Making):   Differential diagnosis: Dehydration, electrolyte abnormality, alcohol intoxication, TIA, CVA, UTI, pneumonia  We will check head CT, CBC, CMP, CPK, troponin, mag, TSH, UA, chest x-ray    ED Course:   Initial assessment performed.  The patients presenting problems have been discussed, and they are in agreement with the care plan formulated and outlined with them. I have encouraged them to ask questions as they arise throughout their visit. Progress Notes:  10:30 PM  Patient presents with possible syncopal episode and is hypotensive with blood pressure of 81/60 on arrival.  EKG shows normal rate, but irregularly irregular rhythm consistent with A. fib. Patient has had several alcoholic beverages tonight as well. Patient has already had a liter of IV fluids in route by EMS. Will give another bolus and monitor closely. Patient is alert and oriented and mental status is normal.  He states he does not feel dizzy sitting in the bed. Patient initially hypotensive but responded to IV fluids. Appears to be in A. fib which is a new diagnosis. Given his syncopal episodes and hypotension along with new A. fib, will admit to hospitalist.  ED Course as of Jul 20 0049   Wed Jul 15, 2020   0143 Case discussed with Dr. Barbara Chan (hospitalist) who will see and admit the patient. Blood pressure is improved after IV fluids.   [AO]      ED Course User Index  [AO] Ivania Sanders MD       Disposition:  Admit to hospitalist    CRITICAL CARE NOTE :        IMPENDING DETERIORATION -Cardiovascular, CNS and Metabolic    ASSOCIATED RISK FACTORS - Hypotension, Dysrhythmia, Metabolic changes and CNS Decompensation    MANAGEMENT- Bedside Assessment and Supervision of Care    INTERPRETATION -  CT Scan, ECG and Blood Pressure    INTERVENTIONS - hemodynamic mngmt and Metobolic interventions    CASE REVIEW - Hospitalist, Nursing and Family    TREATMENT RESPONSE -Improved    PERFORMED BY - Self        NOTES   :      I have spent 45 minutes of critical care time involved in lab review, consultations with specialist, family decision- making, bedside attention and documentation. During this entire length of time I was immediately available to the patient . Kirit Godinez MD          Diagnosis     Clinical Impression:   1. Syncope and collapse    2.  Atrial fibrillation, unspecified type ([address]    3. Alcoholic intoxication with complication ([facility]    4. Dehydration    5.  Hypotension, unspecified hypotension type

## 2020-07-15 NOTE — PROGRESS NOTES
TRANSFER - IN REPORT:    Verbal report received from Noa Nicholson ED RN(name) on Lily Stanton  being received from ED(unit) for routine progression of care      Report consisted of patients Situation, Background, Assessment and   Recommendations(SBAR). Information from the following report(s) SBAR, Kardex, ED Summary, Intake/Output, MAR, Recent Results and Cardiac Rhythm Afib was reviewed with the receiving nurse. Opportunity for questions and clarification was provided. Assessment completed upon patients arrival to unit and care assumed. 0224: Report received from Galo Carbajal on ED    0302: Pt received from [address] [place] [address]: Pt in bed resting quietly. Room Air. Tachycardic, Afib Irregular. Pt is asymptomatic. Reports no pain, no SOB, no dizziness, no N/V. Patient vital signs are within pt's normal limits. Will continue to monitor. 0402: Pt walked to bathroom with no gait disturbance, no reports of SOB, no pain, no dizziness. Tolerated activity well. 0510: Pt walked to bathroom. HR 130s. Suggested pt to use the urinal due to HR being high. No complaints of pain, no SOB, no dizziness. Will continue to monitor pt.    0512: Pt returned to bed. .      2111: Called MD about pt's HR 115s resting in bed; HR 130s ambulating to the bathroom. MD stated to monitor for now and to call MD if HR stays 130s. Pt asymptomatic. No complaints of pain, no dizziness, no SOB. Will continue to monitor. 0700: Bedside shift change report given to Herbert Carpenter (oncoming nurse) by Skylar Argueta RN (offgoing nurse). Report included the following information SBAR, Kardex, ED Summary, Intake/Output, MAR, Recent Results and Cardiac Rhythm Afib.

## 2020-07-15 NOTE — PROGRESS NOTES
Hospitalist Progress Note    NAME: Demetria Bose   :     MRN:  [id number]     Subjective:   Daily Progress Note: 7/15/2020 10:27 AM      Chief complaint: admitted with dizziness/syncope/Afib  Got dizzy and HR went up when he got up to chair today. Other wise no CP/N/V. Spoke with wife at bedside in detail and answered all questions to her best satisfaction. Current Facility-Administered Medications   Medication Dose Route Frequency    sodium chloride (NS) flush 5-40 mL  5-40 mL IntraVENous Q8H    sodium chloride (NS) flush 5-40 mL  5-40 mL IntraVENous PRN    LORazepam (ATIVAN) injection 2 mg  2 mg IntraVENous Q1H PRN    LORazepam (ATIVAN) injection 4 mg  4 mg IntraVENous P3D PRN    folic acid (FOLVITE) tablet 1 mg  1 mg Oral DAILY    please enter patient weight and height in chart   1 Each Other ONCE    pantoprazole (PROTONIX) tablet 40 mg  40 mg Oral ACB    enoxaparin (LOVENOX) injection 90 mg  90 mg SubCUTAneous Q12H    thiamine mononitrate (B-1) tablet 100 mg  100 mg Oral DAILY    therapeutic multivitamin (THERAGRAN) tablet 1 Tab  1 Tab Oral DAILY    acetaminophen (TYLENOL) tablet 650 mg  650 mg Oral Q6H PRN    aluminum-magnesium hydroxide (MAALOX) oral suspension 15 mL  15 mL Oral QID PRN    dilTIAZem IR (CARDIZEM) tablet 30 mg  30 mg Oral Q6H    . PHARMACY TO SUBSTITUTE PER PROTOCOL (Reordered from: hydroxychloroquine sulfate (HYDROXYCHLOROQUINE PO))    Per Protocol          Objective:     Visit Vitals  /68 (BP 1 Location: Right arm, BP Patient Position: At rest)   Pulse (!) 114   Temp 98.5 °F (36.9 °C)   Resp 16   Ht 5' 11\" (1.803 m)   Wt 85.2 kg (187 lb 14.4 oz)   SpO2 95%   BMI 26.21 kg/m²      O2 Device: Room air    Temp (24hrs), Av.9 °F (36.6 °C), Min:97.3 °F (36.3 °C), Max:98.5 °F (36.9 °C)        PHYSICAL EXAM:    Gen: Well-developed, well-nourished, in no acute distress  Resp: No accessory muscle use, clear breath sounds without wheezes rales or rhonchi  Card: No murmurs, normal S1, S2 without thrills or peripheral edema  Abd:  Soft, non-tender, non-distended  Musc: No cyanosis or clubbing  Skin: No rashes or ulcers, skin turgor is good  Neuro:  strength is 5/5 bilaterally, hip flexion is 5/5 bilaterally, follows commands appropriately  Psych:  Good insight, oriented to person, place and time, alert      Data Review    Recent Results (from the past 24 hour(s))   CBC WITH AUTOMATED DIFF    Collection Time: 07/14/20 10:00 PM   Result Value Ref Range    WBC 5.0 4.1 - 11.1 K/uL    RBC 3.58 (L) 4.10 - 5.70 M/uL    HGB 11.8 (L) 12.1 - 17.0 g/dL    HCT 34.1 (L) 36.6 - 50.3 %    MCV 95.3 80.0 - 99.0 FL    MCH 33.0 26.0 - 34.0 PG    MCHC 34.6 30.0 - 36.5 g/dL    RDW 11.8 11.5 - 14.5 %    PLATELET 722 444 - 216 K/uL    MPV 10.4 8.9 - 12.9 FL    NRBC 0.0 0  WBC    ABSOLUTE NRBC 0.00 0.00 - 0.01 K/uL    NEUTROPHILS 61 32 - 75 %    LYMPHOCYTES 26 12 - 49 %    MONOCYTES 10 5 - 13 %    EOSINOPHILS 2 0 - 7 %    BASOPHILS 1 0 - 1 %    IMMATURE GRANULOCYTES 0 0.0 - 0.5 %    ABS. NEUTROPHILS 3.0 1.8 - 8.0 K/UL    ABS. LYMPHOCYTES 1.3 0.8 - 3.5 K/UL    ABS. MONOCYTES 0.5 0.0 - 1.0 K/UL    ABS. EOSINOPHILS 0.1 0.0 - 0.4 K/UL    ABS. BASOPHILS 0.0 0.0 - 0.1 K/UL    ABS. IMM. GRANS. 0.0 0.00 - 0.04 K/UL    DF AUTOMATED     METABOLIC PANEL, COMPREHENSIVE    Collection Time: 07/14/20 10:00 PM   Result Value Ref Range    Sodium 140 136 - 145 mmol/L    Potassium 3.2 (L) 3.5 - 5.1 mmol/L    Chloride 109 (H) 97 - 108 mmol/L    CO2 23 21 - 32 mmol/L    Anion gap 8 5 - 15 mmol/L    Glucose 90 65 - 100 mg/dL    BUN 15 6 - 20 MG/DL    Creatinine 0.74 0.70 - 1.30 MG/DL    BUN/Creatinine ratio 20 12 - 20      GFR est AA >60 >60 ml/min/1.73m2    GFR est non-AA >60 >60 ml/min/1.73m2    Calcium 6.9 (L) 8.5 - 10.1 MG/DL    Bilirubin, total 0.2 0.2 - 1.0 MG/DL    ALT (SGPT) 19 12 - 78 U/L    AST (SGOT) 14 (L) 15 - 37 U/L    Alk.  phosphatase 80 45 - 117 U/L    Protein, total 5.1 (L) 6.4 - 8.2 g/dL    Albumin 2.5 (L) 3.5 - 5.0 g/dL    Globulin 2.6 2.0 - 4.0 g/dL    A-G Ratio 1.0 (L) 1.1 - 2.2     CK W/ CKMB & INDEX    Collection Time: 07/14/20 10:00 PM   Result Value Ref Range    CK - MB 1.2 <3.6 NG/ML    CK-MB Index 2.1 0.0 - 2.5      CK 58 39 - 308 U/L   TROPONIN I    Collection Time: 07/14/20 10:00 PM   Result Value Ref Range    Troponin-I, Qt. <0.05 <0.05 ng/mL   SAMPLES BEING HELD    Collection Time: 07/14/20 10:00 PM   Result Value Ref Range    SAMPLES BEING HELD  ROMULO     COMMENT        Add-on orders for these samples will be processed based on acceptable specimen integrity and analyte stability, which may vary by analyte.    MAGNESIUM    Collection Time: 07/14/20 10:00 PM   Result Value Ref Range    Magnesium 1.6 1.6 - 2.4 mg/dL   PROTHROMBIN TIME + INR    Collection Time: 07/14/20 10:00 PM   Result Value Ref Range    INR 1.2 (H) 0.9 - 1.1      Prothrombin time 11.9 (H) 9.0 - 11.1 sec   PTT    Collection Time: 07/14/20 10:00 PM   Result Value Ref Range    aPTT 28.8 22.1 - 32.0 sec    aPTT, therapeutic range     58.0 - 77.0 SECS   TSH 3RD GENERATION    Collection Time: 07/14/20 10:00 PM   Result Value Ref Range    TSH 3.26 0.36 - 3.74 uIU/mL   EKG, 12 LEAD, INITIAL    Collection Time: 07/14/20 10:00 PM   Result Value Ref Range    Ventricular Rate 75 BPM    Atrial Rate 234 BPM    QRS Duration 70 ms    Q-T Interval 408 ms    QTC Calculation (Bezet) 455 ms    Calculated R Axis -18 degrees    Calculated T Axis -20 degrees    Diagnosis       Atrial fibrillation  Minimal voltage criteria for LVH, may be normal variant  Inferior infarct , age undetermined  When compared with ECG of 20-OCT-2009 14:12,  Previous ECG has undetermined rhythm, needs review  Inferior infarct is now present  T wave amplitude has decreased in Lateral leads     ETHYL ALCOHOL    Collection Time: 07/14/20 10:41 PM   Result Value Ref Range    ALCOHOL(ETHYL),SERUM 89 (H) <10 MG/DL   URINALYSIS W/ REFLEX CULTURE    Collection Time: 07/15/20 12:47 AM    Specimen: Urine   Result Value Ref Range    Color YELLOW/STRAW      Appearance CLEAR CLEAR      Specific gravity 1.008 1.003 - 1.030      pH (UA) 6.5 5.0 - 8.0      Protein Negative NEG mg/dL    Glucose Negative NEG mg/dL    Ketone Negative NEG mg/dL    Bilirubin Negative NEG      Blood Negative NEG      Urobilinogen 0.2 0.2 - 1.0 EU/dL    Nitrites Negative NEG      Leukocyte Esterase Negative NEG      WBC 0-4 0 - 4 /hpf    RBC 0-5 0 - 5 /hpf    Epithelial cells FEW FEW /lpf    Bacteria Negative NEG /hpf    UA:UC IF INDICATED CULTURE NOT INDICATED BY UA RESULT CNI      Hyaline cast 2-5 0 - 5 /lpf   CK W/ CKMB & INDEX    Collection Time: 07/15/20  4:05 AM   Result Value Ref Range    CK - MB 1.2 <3.6 NG/ML    CK-MB Index 1.6 0.0 - 2.5      CK 77 39 - 308 U/L   TROPONIN I    Collection Time: 07/15/20  4:05 AM   Result Value Ref Range    Troponin-I, Qt. <0.05 <8.68 ng/mL   METABOLIC PANEL, COMPREHENSIVE    Collection Time: 07/15/20  4:05 AM   Result Value Ref Range    Sodium 139 136 - 145 mmol/L    Potassium 4.3 3.5 - 5.1 mmol/L    Chloride 110 (H) 97 - 108 mmol/L    CO2 23 21 - 32 mmol/L    Anion gap 6 5 - 15 mmol/L    Glucose 93 65 - 100 mg/dL    BUN 13 6 - 20 MG/DL    Creatinine 0.66 (L) 0.70 - 1.30 MG/DL    BUN/Creatinine ratio 20 12 - 20      GFR est AA >60 >60 ml/min/1.73m2    GFR est non-AA >60 >60 ml/min/1.73m2    Calcium 7.4 (L) 8.5 - 10.1 MG/DL    Bilirubin, total 0.3 0.2 - 1.0 MG/DL    ALT (SGPT) 20 12 - 78 U/L    AST (SGOT) 20 15 - 37 U/L    Alk.  phosphatase 88 45 - 117 U/L    Protein, total 5.9 (L) 6.4 - 8.2 g/dL    Albumin 2.9 (L) 3.5 - 5.0 g/dL    Globulin 3.0 2.0 - 4.0 g/dL    A-G Ratio 1.0 (L) 1.1 - 2.2     CBC W/O DIFF    Collection Time: 07/15/20  4:05 AM   Result Value Ref Range    WBC 7.2 4.1 - 11.1 K/uL    RBC 3.89 (L) 4.10 - 5.70 M/uL    HGB 12.7 12.1 - 17.0 g/dL    HCT 37.3 36.6 - 50.3 %    MCV 95.9 80.0 - 99.0 FL    MCH 32.6 26.0 - 34.0 PG    MCHC 34.0 30.0 - 36.5 g/dL RDW 11.9 11.5 - 14.5 %    PLATELET 342 785 - 073 K/uL    MPV 10.7 8.9 - 12.9 FL    NRBC 0.0 0  WBC    ABSOLUTE NRBC 0.00 0.00 - 0.01 K/uL   MAGNESIUM    Collection Time: 07/15/20  4:05 AM   Result Value Ref Range    Magnesium 2.0 1.6 - 2.4 mg/dL   DUPLEX CAROTID BILATERAL    Collection Time: 07/15/20  9:26 AM   Result Value Ref Range    Right subclavian sys 53.5 cm/s    RIGHT SUBCLAVIAN ARTERY D 0.00 cm/s    Right cca dist sys 78.2 cm/s    Right CCA dist vickers 29.2 cm/s    Right CCA prox sys 72.2 cm/s    Right CCA prox vickers 23.5 cm/s    Right ICA dist sys 49.6 cm/s    Right ICA dist vickers 22.4 cm/s    Right ICA mid sys 69.9 cm/s    Right ICA mid vickers 34.3 cm/s    Right ICA prox sys 34.8 cm/s    Right ICA prox vickers 17.3 cm/s    Right eca sys 64.3 cm/s    RIGHT EXTERNAL CAROTID ARTERY D 19.00 cm/s    Right vertebral sys 50.0 cm/s    RIGHT VERTEBRAL ARTERY D 23.50 cm/s    Right ICA/CCA sys 0.9     Left subclavian sys 91.5 cm/s    LEFT SUBCLAVIAN ARTERY D 0.00 cm/s    Left CCA dist sys 57.6 cm/s    Left CCA dist vickers 19.5 cm/s    Left CCA prox sys 94.6 cm/s    Left CCA prox vickers 23.0 cm/s    Left ICA dist sys 56.9 cm/s    Left ICA dist vickers 26.0 cm/s    Left ICA mid sys 47.8 cm/s    Left ICA mid vickers 20.1 cm/s    Left ICA prox sys 43.7 cm/s    Left ICA prox vickers 16.3 cm/s    Left ECA sys 69.4 cm/s    LEFT EXTERNAL CAROTID ARTERY D 10.30 cm/s    Left vertebral sys 39.2 cm/s    LEFT VERTEBRAL ARTERY D 20.00 cm/s    Left ICA/CCA sys 1.00    ECHO ADULT COMPLETE    Collection Time: 07/15/20  9:28 AM   Result Value Ref Range    IVSd 2.13 (A) 0.6 - 1.0 cm    IVSs 2.00 cm    LVIDd 3.38 (A) 4.2 - 5.9 cm    LVIDs 2.62 cm    LVOT d 2.13 cm    LVPWd 1.50 (A) 0.6 - 1.0 cm    LVPWs 2.30 cm    RVIDd 2.45 cm    Left Atrium Major Axis 3.57 cm    LA Volume 71.48 18 - 58 mL    LA Area 4C 18.49 cm2    LA Vol 2C 86.24 (A) 18 - 58 mL    LA Vol 4C 47.93 18 - 58 mL    Est. RA Pressure 10.00 mmHg    MV A Scott 0.48 cm/s    Mitral Valve E Wave Deceleration Time 0.17 s    MV E Scott 76.08 cm/s    Mitral Valve Pressure Half-time 0.04 s    MVA (PHT) 5.13 cm2    Pulmonic Valve Systolic Peak Instantaneous Gradient 2.82 mmHg    Pulmonic Valve Max Velocity 83.56 cm/s    Ao Root D 3.43 cm    MV E/A 158.50     LV Mass .1 88 - 224 g    LV Mass AL Index 124.2 49 - 115 g/m2    Left Atrium Minor Axis 1.74 cm    LA Vol Index 34.81 16 - 28 ml/m2    LA Vol Index 41.99 16 - 28 ml/m2    LA Vol Index 23.34 16 - 28 ml/m2     No results found for this visit on 07/14/20. All Micro Results     None           Radiology reports and films for the last 24 hours have been reviewed. Assessment/Plan:     Syncope and collapse POA/new onset atrial fibrillation POA/hypotension POA  - likely due to cardiac arrhythmia  - cardiology consultation pend  - echocardiogram ok ef 55%, carotid Doppler ok and cardiac enzymes neg x 2  -taper  IV fluids  -Hold antihypertensive medications   -CT head is unremarkable  - TSH ok  -No focal neurological deficits  - Lovenox for anticoagulation for now defer longterm   AC to cards  - start low dose cardizem with holding parameters and monitor HR with ambulation   -Hypotension is currently resolved with IV fluids given in the ER        History of hypertension;  -Hold lisinopril and amlodipine   - added cardizem monitor BP     Alcoholism:  - drinking 4-5 drinks every day. -  on CIWA protocol but low risk for WD   - thiamine/folate/mvt  -Counseled to decreased amount     Hypokalemia  repleted     History of prostate cancer: In remission      Rheumatoid arthritis: on  hydroxychloroquine      Tobacco abuse: Counseled about tobacco cessation     Code Status: Full code  Surrogate Decision Maker: Wife Harry Gonzalez     DVT Prophylaxis: Lovenox    Baseline:  Independent    Care Plan discussed with: patient and wife    Dispo: if continues to improve likely home 1-2 days    Signed By: Caren Uribe MD     July 15, 2020

## 2020-07-15 NOTE — PROGRESS NOTES
TR:  1) Home with f/u appts  2) State and CARLOS obs letters given  3) Substance use resources     Reason for Admission:   Syncope and collapse                 RUR Score:   N/A. Pt under observation                  Plan for utilizing home health:   PT consult pending    PCP: First and Last name:  Nj Garcia   Name of Practice: [facility]   Are you a current patient: Yes/No: Yes   Approximate date of last visit: 2 years ago   Can you participate in a virtual visit with your PCP: Yes                    Current Advanced Directive/Advance Care Plan:  None on file. Pt says he has one but wife keeps it. Transition of Care Plan:                      Patient is a 69year old  male admitted with syncope and collapse. Pt was alert and oriented when speaking to CM and was able to answer questions appropriately. Pt verified demographics, emergency contact, and PCP. Pt sees Leonela Longoria in urology and Wolfgang Pierce in rheumatology. Pt lives with his wife Melissa Avendaño in a 2 story house with 1 step to get in. Pt is independent with ADLs and uses no DME. Pt has not had HH or been to SNF or IPR in the past. Pt drives himself but his wife will pick him up at discharge. Pt uses the [address] and has no problems affording his medications. Pt has good support with wife and son and did not identify any concerns for discharge at this time. Pt will likely discharge home with f/u appt, possibly HH PT. CM gave pt state and CARLOS observation letters and went over contents. Pt accepted documents but would not sign copies for chart. CM will continue to follow pt and assist with any needs he may have this stay. Care Management Interventions  PCP Verified by CM: Yes  Mode of Transport at Discharge:  Other (see comment)(wife)  Transition of Care Consult (CM Consult): Discharge Planning  Discharge Durable Medical Equipment: No  Physical Therapy Consult: Yes  Occupational Therapy Consult: No  Speech Therapy Consult: No  Current Support Network: Lives with Spouse, Own Home, Family Lives Nearby  Confirm Follow Up Transport: Self  The Patient and/or Patient Representative was Provided with a Choice of Provider and Agrees with the Discharge Plan?: Yes  Freedom of Choice List was Provided with Basic Dialogue that Supports the Patient's Individualized Plan of Care/Goals, Treatment Preferences and Shares the Quality Data Associated with the Providers?: Yes  [employer] Information Provided?: No    Awilda Iglesias RN ,[facility]  [phone]

## 2020-07-15 NOTE — CONSULTS
[address] [facility]     Date of  Admission: 7/14/2020  9:52 PM     Admission type:Emergency    Consult for: a fib  Consult by: hospitalist      Subjective:     Ml Peters is a 69 y.o. male  With PMH HTN, RA, GERD, glaucoma, prostate CA who was admitted for Syncope and collapse [R55]. Per ED provider note Ml Peters presented to the ED with c/o syncopal episode witnessed by his wife. He was sitting and had become unresponsive for a few minutes. C/o feeling dizzy afterwards and had n/v. Why hypotensive with EMS. Cardiology consulted for a fib. On assessment, Ml Peters endorses what he can remember from the episode yesterday. He states no prior episodes like this except for a dizzy spell last year in the Summer when he was working on his boat and became very light-headed and had to lay on the ground and pour water on himself. Mr. Leydi Shelton states he was out cutting the grass yesterday morning and is outside a fair amount. He and his wife endorse that he does not drink much water. He does have 4-5 alcoholic drinks a day. Mr. Leydi Shelton denies further n/v, no SOB, chest pain, palpitations, leg swelling. He cannot tell that he is in a fib right now. He denies any bleeding history    Ml Peters  Does not follow with a cardiologist.  No prior records in our system. Cardiac risk factors: male gender, hypertension.     Patient Active Problem List    Diagnosis Date Noted    Syncope and collapse 07/15/2020    Atrial fibrillation ([address] 07/15/2020      Tyrone Costello MD  Past Medical History:   Diagnosis Date    Arthritis     Hypertension     Ill-defined condition     glaucoma-left eye      Social History     Socioeconomic History    Marital status:      Spouse name: Not on file    Number of children: Not on file    Years of education: Not on file    Highest education level: Not on file   Tobacco Use    Smoking status: Current Every Day Smoker Packs/day: 1.00    Smokeless tobacco: Never Used   Substance and Sexual Activity    Alcohol use: Yes    Drug use: Not Currently     No Known Allergies   History reviewed. No pertinent family history. Current Facility-Administered Medications   Medication Dose Route Frequency    sodium chloride (NS) flush 5-40 mL  5-40 mL IntraVENous Q8H    sodium chloride (NS) flush 5-40 mL  5-40 mL IntraVENous PRN    LORazepam (ATIVAN) injection 2 mg  2 mg IntraVENous Q1H PRN    LORazepam (ATIVAN) injection 4 mg  4 mg IntraVENous W7K PRN    folic acid (FOLVITE) tablet 1 mg  1 mg Oral DAILY    please enter patient weight and height in chart   1 Each Other ONCE    pantoprazole (PROTONIX) tablet 40 mg  40 mg Oral ACB    enoxaparin (LOVENOX) injection 90 mg  90 mg SubCUTAneous Q12H    thiamine mononitrate (B-1) tablet 100 mg  100 mg Oral DAILY    therapeutic multivitamin (THERAGRAN) tablet 1 Tab  1 Tab Oral DAILY    acetaminophen (TYLENOL) tablet 650 mg  650 mg Oral Q6H PRN    aluminum-magnesium hydroxide (MAALOX) oral suspension 15 mL  15 mL Oral QID PRN    dilTIAZem IR (CARDIZEM) tablet 30 mg  30 mg Oral Q6H    . PHARMACY TO SUBSTITUTE PER PROTOCOL (Reordered from: hydroxychloroquine sulfate (HYDROXYCHLOROQUINE PO))    Per Protocol        Review of Symptoms:   11 systems reviewed, negative other than as stated in the HPI        Objective:      Visit Vitals  /76 (BP 1 Location: Right arm, BP Patient Position: At rest)   Pulse (!) 112   Temp 98.3 °F (36.8 °C)   Resp 18   Ht 5' 11\" (1.803 m)   Wt 85.2 kg (187 lb 14.4 oz)   SpO2 100%   BMI 26.21 kg/m²       Physical:   General: pleasant, adult  male appears younger than states age  Heart: irregularRR, no m/S3/JVD, no carotid bruits   Lungs: clear   Abdomen: Soft, +BS, NTND   Extremities: LE david +DP/PT, no edema   Neurologic: Grossly normal    Data Review:   Recent Labs     07/15/20  0405 07/14/20  2200   WBC 7.2 5.0   HGB 12.7 11.8*   HCT 37.3 34.1*   PLT 187 173     Recent Labs     07/15/20  0405 07/14/20  2200    140   K 4.3 3.2*   * 109*   CO2 23 23   GLU 93 90   BUN 13 15   CREA 0.66* 0.74   CA 7.4* 6.9*   MG 2.0 1.6   ALB 2.9* 2.5*   TBILI 0.3 0.2   ALT 20 19   INR  --  1.2*       Recent Labs     07/15/20  0405 07/14/20  2200   TROIQ <0.05 <0.05   CPK 77 58   CKMB 1.2 1.2         Intake/Output Summary (Last 24 hours) at 7/15/2020 1130  Last data filed at 7/15/2020 0800  Gross per 24 hour   Intake 1179.17 ml   Output 800 ml   Net 379.17 ml        Cardiographics    Telemetry: a fib 110s-130  ECG: a fib  Echocardiogram: EF 50-55%; mild concentric hypertrophy; mild MR. CXRAY: no acute process        Assessment:       Active Problems:    Syncope and collapse (7/15/2020)      Atrial fibrillation ([address] (7/15/2020)         Plan:     Velvet Mccall is a 69 y.o. male who presented to the ED after a syncopal event at home. Was hypotensive with EMS, which improved with IVF at the hospital.  Had been doing yard work yesterday. Regularly drinks a moderate amount of alcohol. Poor water intake. Found to be in a fib. Troponin negative x 2. Electrolytes stable. · Syncopal event appears triggered by patient's dehydration (despite labs being fairly normal). Patient had a similar dizzy situation last Summer when working on his boat. A fib may have exacerbated the situation when coupled with his hypotension. · PO cardizem starting for rate control of a fib now that BP is improved after IVF. If patient starts withdrawing from alcohol, may have a more difficult time with rate control. · Receiving lovenox for [facility] [place] currently. CHADSVASC=2  Transition to eliquis prior to discharge. Unclear how long he has been in a fib. Could have been triggered by his dehydration/hypotension. · Patient will hopefully convert to sinus rhythm on his own with continued hydration and the cardizem. Consider future ENEDELIA/cardioversion, if needed.     · ECHO with EF 50-55% and new new concerns. This coupled with negative troponin makes ischemic causes less likely. · Continue to hold home HTN meds  · Continue continuous cardiac monitoring. See no other significant ectopy at this time. Thank you for consulting [facility]    Carlotta Gomez NP DNP, RN, [facility]    Patient seen and examined by me with nurse practitioner. I personally performed all components of the history, physical, and medical decision making and agree with the assessment and plan as noted. Syncope most likely due to hypotension and a. Fib.      Latisha Lobato MD

## 2020-07-15 NOTE — PROGRESS NOTES
Bedside and Verbal shift change report given to [address] (oncoming nurse) by Ivis Grant RN (offgoing nurse). Report included the following information SBAR, Kardex, Intake/Output, MAR, Accordion, Recent Results, Med Rec Status and Cardiac Rhythm A fib.    0800: patient awake and alert in bed. Answers questions appropriately and follows commands. 0845: patient off with transport to Vascular. Heart rate jumped to 140s with activity. Will monitor.

## 2020-07-15 NOTE — ED NOTES
Assumed care of pt from EMS. Pt with syncopal episode and episode of vomiting. EMS noted pt with low BP and irregular heart rate. Received approx 2.5 liters of normal saline prior to arrival in ED. EMS also administered 4mg Zofran IV prior to arrival. Positioned for comfort on stretcher. Call bell within reach. Monitor x3, a fib with rate in 70s. Pt denies hx of a fib, reports a normal EKG done by PCP several years ago.

## 2020-07-15 NOTE — H&P
Hospitalist Admission Note    NAME: Simi Gaona   :     MRN:  [id number]     Date/Time:  7/15/2020 2:17 AM    Patient PCP: Antwan Trimble MD  ______________________________________________________________________  Given the patient's current clinical presentation, I have a high level of concern for decompensation if discharged from the emergency department. Complex decision making was performed, which includes reviewing the patient's available past medical records, laboratory results, and x-ray films. My assessment of this patient's clinical condition and my plan of care is as follows. Assessment / Plan:  Syncope and collapse POA/new onset atrial fibrillation POA/hypotension POA  -Etiology unclear, likely due to cardiac arrhythmia  -Patient is noticed to have new onset atrial fibrillation on his EKG which is rate controlled  -He was also noted to be hypotensive and clinically looks slightly dehydrated  -We will admit to telemetry  -Obtain cardiology consultation  -Obtain TSH, echocardiogram, carotid Doppler and cardiac enzymes  -We will give IV fluids  -Hold antihypertensive medications for  -CT head is unremarkable  -No focal neurological deficits  -We will start Lovenox for anticoagulation for now, will transition to Dovonex on discharge  -Atrial fibrillation is rate controlled  -Hypotension is currently resolved with IV fluids given in the ER      History of hypertension;  -Hold lisinopril and amlodipine due to hypotension    Alcoholism:  -Patient admits to drinking 4-5 drinks every day. -We will place on CIWA protocol  -Check TSH, B12 and folate  -Start thiamine  -Counseled about moderation    Hypokalemia and hypomagnesia:  Will be replaced IV    History of prostate cancer:  In remission      Rheumatoid arthritis: Hold hydroxychloroquine for now    Tobacco abuse: Counseled about tobacco cessation    Code Status: Full code  Surrogate Decision Maker: Wife Mike Rivers    DVT Prophylaxis: Lovenox  GI Prophylaxis: not indicated    Baseline: Independent      Subjective:   CHIEF COMPLAINT: Syncope and collapse    HISTORY OF PRESENT ILLNESS:       Didier Blount, 69 y.o. male with PMHx significant for hypertension, rheumatoid arthritis, GERD, glaucoma, and prostate cancer presents to the ED with a chief complaint of a syncopal episode tonight. Patient reports that he was feeling well through the day but Tonight after he ate dinner and drank several alcoholic drinks (4-5). He suddenly became dizzy and felt disoriented. His wife says that he slumped over for proximately 2 to 5 minutes and was not responding to her. He then became more responsive, but was mumbling and she was having a difficult time understanding his speech. Patient states at that time he started feeling like he was sweating profusely and became more dizzy. He describes his dizziness as room spinning and not lightheaded feeling. He developed nausea and several episodes of nonbloody nonbilious emesis. EMS was called and found patient to be in A. fib with blood pressures that were in the 79Q to 44I systolic. Patient denies any fall or head injury. He states he was sitting in his chair the whole time that these episodes were occurring. He denies any numbness, tingling, weakness, fever, chills, chest pain, shortness of breath. He reports a cough, but attributes it to his 1 pack/day smoking. Patient admits that he drinks 4-5 drinks every night. He denies any dysuria or hematuria, but does report nocturia. He has never had a similar episode and does not have a history of atrial fibrillation. He states that right now his dizziness is improved from when he was at home. Patient denies any chest pain, shortness of breath or subjective feeling of palpitation  We were asked to admit for work up and evaluation of the above problems.      Past Medical History:   Diagnosis Date    Arthritis     Hypertension     Ill-defined condition     glaucoma-left eye        History reviewed. No pertinent surgical history. Social History     Tobacco Use    Smoking status: Current Every Day Smoker     Packs/day: 1.00    Smokeless tobacco: Never Used   Substance Use Topics    Alcohol use: Yes        History reviewed. No pertinent family history. Coronary artery disease in father and cancer in mother  No Known Allergies     Prior to Admission medications    Medication Sig Start Date End Date Taking? Authorizing Provider   LISINOPRIL PO Take  by mouth. Yes Other, MD Arelis   amlodipine besylate (AMLODIPINE PO) Take  by mouth. Yes Other, MD Arelis   glucosamine-chondroitin (ARTHX) 500-400 mg cap Take 1 Cap by mouth daily. Yes Other, MD Arelis   hydroxychloroquine sulfate (HYDROXYCHLOROQUINE PO) Take  by mouth. Yes Arturo, MD Arelis       REVIEW OF SYSTEMS:     I am not able to complete the review of systems because:    The patient is intubated and sedated    The patient has altered mental status due to his acute medical problems    The patient has baseline aphasia from prior stroke(s)    The patient has baseline dementia and is not reliable historian    The patient is in acute medical distress and unable to provide information           Total of 12 systems reviewed as follows:       POSITIVE= underlined text  Negative = text not underlined  General:  fever, chills, sweats, generalized weakness, weight loss/gain,      loss of appetite   Eyes:    blurred vision, eye pain, loss of vision, double vision  ENT:    rhinorrhea, pharyngitis   Respiratory:   cough, sputum production, SOB, ROBERT, wheezing, pleuritic pain   Cardiology:   chest pain, palpitations, orthopnea, PND, edema, syncope   Gastrointestinal:  abdominal pain , N/V, diarrhea, dysphagia, constipation, bleeding   Genitourinary:  frequency, urgency, dysuria, hematuria, incontinence   Muskuloskeletal :  arthralgia, myalgia, back pain  Hematology:  easy bruising, nose or gum bleeding, lymphadenopathy   Dermatological: rash, ulceration, pruritis, color change / jaundice  Endocrine:   hot flashes or polydipsia   Neurological:  headache, dizziness, confusion, focal weakness, paresthesia,     Speech difficulties, memory loss, gait difficulty  Psychological: Feelings of anxiety, depression, agitation    Objective:   VITALS:    Visit Vitals  /74   Pulse (!) 107   Temp 97.3 °F (36.3 °C)   Resp 17   SpO2 96%       PHYSICAL EXAM:    General:    Alert, cooperative, no distress, appears stated age. HEENT: Atraumatic, anicteric sclerae, pink conjunctivae     No oral ulcers, mucosa moist, throat clear, dentition fair  Neck:  Supple, symmetrical,  thyroid: non tender  Lungs:   Clear to auscultation bilaterally. No Wheezing or Rhonchi. No rales. Chest wall:  No tenderness  No Accessory muscle use. Heart:   Irregularly irregular rhythm,  No  murmur   No edema  Abdomen:   Soft, non-tender. Not distended. Bowel sounds normal  Extremities: No cyanosis. No clubbing,      Skin turgor normal, Capillary refill normal, Radial dial pulse 2+  Skin:     Not pale. Not Jaundiced  No rashes   Psych:  Good insight. Not depressed. Not anxious or agitated. Neurologic: EOMs intact. No facial asymmetry. No aphasia or slurred speech. Symmetrical strength, Sensation grossly intact.  Alert and oriented X 4.     _______________________________________________________________________  Care Plan discussed with:    Comments   Patient x    Family  x    RN     Care Manager                    Consultant:      _______________________________________________________________________  Expected  Disposition:   Home with Family x   HH/PT/OT/RN    SNF/LTC    [facility]    ________________________________________________________________________  TOTAL TIME:  43 Minutes    Critical Care Provided     Minutes non procedure based      Comments     Reviewed previous records   >50% of visit spent in counseling and coordination of care Discussion with patient and/or family and questions answered       ________________________________________________________________________  Signed: Osmani Vazquez MD    Procedures: see electronic medical records for all procedures/Xrays and details which were not copied into this note but were reviewed prior to creation of Plan. LAB DATA REVIEWED:    Recent Results (from the past 24 hour(s))   CBC WITH AUTOMATED DIFF    Collection Time: 07/14/20 10:00 PM   Result Value Ref Range    WBC 5.0 4.1 - 11.1 K/uL    RBC 3.58 (L) 4.10 - 5.70 M/uL    HGB 11.8 (L) 12.1 - 17.0 g/dL    HCT 34.1 (L) 36.6 - 50.3 %    MCV 95.3 80.0 - 99.0 FL    MCH 33.0 26.0 - 34.0 PG    MCHC 34.6 30.0 - 36.5 g/dL    RDW 11.8 11.5 - 14.5 %    PLATELET 336 746 - 224 K/uL    MPV 10.4 8.9 - 12.9 FL    NRBC 0.0 0  WBC    ABSOLUTE NRBC 0.00 0.00 - 0.01 K/uL    NEUTROPHILS 61 32 - 75 %    LYMPHOCYTES 26 12 - 49 %    MONOCYTES 10 5 - 13 %    EOSINOPHILS 2 0 - 7 %    BASOPHILS 1 0 - 1 %    IMMATURE GRANULOCYTES 0 0.0 - 0.5 %    ABS. NEUTROPHILS 3.0 1.8 - 8.0 K/UL    ABS. LYMPHOCYTES 1.3 0.8 - 3.5 K/UL    ABS. MONOCYTES 0.5 0.0 - 1.0 K/UL    ABS. EOSINOPHILS 0.1 0.0 - 0.4 K/UL    ABS. BASOPHILS 0.0 0.0 - 0.1 K/UL    ABS. IMM. GRANS. 0.0 0.00 - 0.04 K/UL    DF AUTOMATED     METABOLIC PANEL, COMPREHENSIVE    Collection Time: 07/14/20 10:00 PM   Result Value Ref Range    Sodium 140 136 - 145 mmol/L    Potassium 3.2 (L) 3.5 - 5.1 mmol/L    Chloride 109 (H) 97 - 108 mmol/L    CO2 23 21 - 32 mmol/L    Anion gap 8 5 - 15 mmol/L    Glucose 90 65 - 100 mg/dL    BUN 15 6 - 20 MG/DL    Creatinine 0.74 0.70 - 1.30 MG/DL    BUN/Creatinine ratio 20 12 - 20      GFR est AA >60 >60 ml/min/1.73m2    GFR est non-AA >60 >60 ml/min/1.73m2    Calcium 6.9 (L) 8.5 - 10.1 MG/DL    Bilirubin, total 0.2 0.2 - 1.0 MG/DL    ALT (SGPT) 19 12 - 78 U/L    AST (SGOT) 14 (L) 15 - 37 U/L    Alk.  phosphatase 80 45 - 117 U/L    Protein, total 5.1 (L) 6.4 - 8.2 g/dL    Albumin 2.5 (L) 3.5 - 5.0 g/dL    Globulin 2.6 2.0 - 4.0 g/dL    A-G Ratio 1.0 (L) 1.1 - 2.2     CK W/ CKMB & INDEX    Collection Time: 07/14/20 10:00 PM   Result Value Ref Range    CK - MB 1.2 <3.6 NG/ML    CK-MB Index 2.1 0.0 - 2.5      CK 58 39 - 308 U/L   TROPONIN I    Collection Time: 07/14/20 10:00 PM   Result Value Ref Range    Troponin-I, Qt. <0.05 <0.05 ng/mL   SAMPLES BEING HELD    Collection Time: 07/14/20 10:00 PM   Result Value Ref Range    SAMPLES BEING HELD  ROMULO     COMMENT        Add-on orders for these samples will be processed based on acceptable specimen integrity and analyte stability, which may vary by analyte.    MAGNESIUM    Collection Time: 07/14/20 10:00 PM   Result Value Ref Range    Magnesium 1.6 1.6 - 2.4 mg/dL   PROTHROMBIN TIME + INR    Collection Time: 07/14/20 10:00 PM   Result Value Ref Range    INR 1.2 (H) 0.9 - 1.1      Prothrombin time 11.9 (H) 9.0 - 11.1 sec   PTT    Collection Time: 07/14/20 10:00 PM   Result Value Ref Range    aPTT 28.8 22.1 - 32.0 sec    aPTT, therapeutic range     58.0 - 77.0 SECS   TSH 3RD GENERATION    Collection Time: 07/14/20 10:00 PM   Result Value Ref Range    TSH 3.26 0.36 - 3.74 uIU/mL   EKG, 12 LEAD, INITIAL    Collection Time: 07/14/20 10:00 PM   Result Value Ref Range    Ventricular Rate 75 BPM    Atrial Rate 234 BPM    QRS Duration 70 ms    Q-T Interval 408 ms    QTC Calculation (Bezet) 455 ms    Calculated R Axis -18 degrees    Calculated T Axis -20 degrees    Diagnosis       Atrial fibrillation  Minimal voltage criteria for LVH, may be normal variant  Inferior infarct , age undetermined  When compared with ECG of 20-OCT-2009 14:12,  Previous ECG has undetermined rhythm, needs review  Inferior infarct is now present  T wave amplitude has decreased in Lateral leads     ETHYL ALCOHOL    Collection Time: 07/14/20 10:41 PM   Result Value Ref Range    ALCOHOL(ETHYL),SERUM 89 (H) <10 MG/DL   URINALYSIS W/ REFLEX CULTURE    Collection Time: 07/15/20 12:47 AM Specimen: Urine   Result Value Ref Range    Color YELLOW/STRAW      Appearance CLEAR CLEAR      Specific gravity 1.008 1.003 - 1.030      pH (UA) 6.5 5.0 - 8.0      Protein Negative NEG mg/dL    Glucose Negative NEG mg/dL    Ketone Negative NEG mg/dL    Bilirubin Negative NEG      Blood Negative NEG      Urobilinogen 0.2 0.2 - 1.0 EU/dL    Nitrites Negative NEG      Leukocyte Esterase Negative NEG      WBC 0-4 0 - 4 /hpf    RBC 0-5 0 - 5 /hpf    Epithelial cells FEW FEW /lpf    Bacteria Negative NEG /hpf    UA:UC IF INDICATED CULTURE NOT INDICATED BY UA RESULT CNI      Hyaline cast 2-5 0 - 5 /lpf

## 2020-07-15 NOTE — ED NOTES
Bedside and Verbal shift change report given to Leo Ji (oncoming nurse) by [address] (offgoing nurse). Report included the following information SBAR.

## 2020-07-16 LAB
ALBUMIN SERPL-MCNC: 3 G/DL (ref 3.5–5)
ALBUMIN/GLOB SERPL: 0.9 {RATIO} (ref 1.1–2.2)
ALP SERPL-CCNC: 80 U/L (ref 45–117)
ALT SERPL-CCNC: 19 U/L (ref 12–78)
ANION GAP SERPL CALC-SCNC: 5 MMOL/L (ref 5–15)
AST SERPL-CCNC: 15 U/L (ref 15–37)
ATRIAL RATE: 234 BPM
BILIRUB SERPL-MCNC: 0.5 MG/DL (ref 0.2–1)
BUN SERPL-MCNC: 9 MG/DL (ref 6–20)
BUN/CREAT SERPL: 12 (ref 12–20)
CALCIUM SERPL-MCNC: 8.3 MG/DL (ref 8.5–10.1)
CALCULATED R AXIS, ECG10: -18 DEGREES
CALCULATED T AXIS, ECG11: -20 DEGREES
CHLORIDE SERPL-SCNC: 110 MMOL/L (ref 97–108)
CO2 SERPL-SCNC: 25 MMOL/L (ref 21–32)
CREAT SERPL-MCNC: 0.73 MG/DL (ref 0.7–1.3)
DIAGNOSIS, 93000: NORMAL
ERYTHROCYTE [DISTWIDTH] IN BLOOD BY AUTOMATED COUNT: 11.9 % (ref 11.5–14.5)
GLOBULIN SER CALC-MCNC: 3.2 G/DL (ref 2–4)
GLUCOSE SERPL-MCNC: 96 MG/DL (ref 65–100)
HCT VFR BLD AUTO: 38.6 % (ref 36.6–50.3)
HGB BLD-MCNC: 13.4 G/DL (ref 12.1–17)
MAGNESIUM SERPL-MCNC: 2 MG/DL (ref 1.6–2.4)
MCH RBC QN AUTO: 32.7 PG (ref 26–34)
MCHC RBC AUTO-ENTMCNC: 34.7 G/DL (ref 30–36.5)
MCV RBC AUTO: 94.1 FL (ref 80–99)
NRBC # BLD: 0 K/UL (ref 0–0.01)
NRBC BLD-RTO: 0 PER 100 WBC
PLATELET # BLD AUTO: 157 K/UL (ref 150–400)
PMV BLD AUTO: 10.5 FL (ref 8.9–12.9)
POTASSIUM SERPL-SCNC: 3.8 MMOL/L (ref 3.5–5.1)
PROT SERPL-MCNC: 6.2 G/DL (ref 6.4–8.2)
Q-T INTERVAL, ECG07: 408 MS
QRS DURATION, ECG06: 70 MS
QTC CALCULATION (BEZET), ECG08: 455 MS
RBC # BLD AUTO: 4.1 M/UL (ref 4.1–5.7)
SODIUM SERPL-SCNC: 140 MMOL/L (ref 136–145)
TROPONIN I SERPL-MCNC: <0.05 NG/ML
VENTRICULAR RATE, ECG03: 75 BPM
WBC # BLD AUTO: 6.2 K/UL (ref 4.1–11.1)

## 2020-07-16 PROCEDURE — 99218 HC RM OBSERVATION: CPT

## 2020-07-16 PROCEDURE — 80053 COMPREHEN METABOLIC PANEL: CPT

## 2020-07-16 PROCEDURE — 97116 GAIT TRAINING THERAPY: CPT

## 2020-07-16 PROCEDURE — 36415 COLL VENOUS BLD VENIPUNCTURE: CPT

## 2020-07-16 PROCEDURE — 74011250637 HC RX REV CODE- 250/637: Performed by: NURSE PRACTITIONER

## 2020-07-16 PROCEDURE — 85027 COMPLETE CBC AUTOMATED: CPT

## 2020-07-16 PROCEDURE — 74011250637 HC RX REV CODE- 250/637: Performed by: INTERNAL MEDICINE

## 2020-07-16 PROCEDURE — 74011000250 HC RX REV CODE- 250: Performed by: NURSE PRACTITIONER

## 2020-07-16 PROCEDURE — 83735 ASSAY OF MAGNESIUM: CPT

## 2020-07-16 PROCEDURE — 97161 PT EVAL LOW COMPLEX 20 MIN: CPT

## 2020-07-16 PROCEDURE — 74011250637 HC RX REV CODE- 250/637: Performed by: HOSPITALIST

## 2020-07-16 PROCEDURE — 96372 THER/PROPH/DIAG INJ SC/IM: CPT

## 2020-07-16 PROCEDURE — 96365 THER/PROPH/DIAG IV INF INIT: CPT

## 2020-07-16 PROCEDURE — 74011250636 HC RX REV CODE- 250/636: Performed by: INTERNAL MEDICINE

## 2020-07-16 PROCEDURE — 65660000000 HC RM CCU STEPDOWN

## 2020-07-16 PROCEDURE — 96366 THER/PROPH/DIAG IV INF ADDON: CPT

## 2020-07-16 PROCEDURE — 87635 SARS-COV-2 COVID-19 AMP PRB: CPT

## 2020-07-16 PROCEDURE — 74011250636 HC RX REV CODE- 250/636: Performed by: NURSE PRACTITIONER

## 2020-07-16 PROCEDURE — 84484 ASSAY OF TROPONIN QUANT: CPT

## 2020-07-16 RX ORDER — DILTIAZEM HYDROCHLORIDE 60 MG/1
60 TABLET, FILM COATED ORAL EVERY 6 HOURS
Status: DISCONTINUED | OUTPATIENT
Start: 2020-07-16 | End: 2020-07-16

## 2020-07-16 RX ADMIN — THIAMINE HCL TAB 100 MG 100 MG: 100 TAB at 09:00

## 2020-07-16 RX ADMIN — LATANOPROST 1 DROP: 50 SOLUTION/ DROPS OPHTHALMIC at 18:18

## 2020-07-16 RX ADMIN — DEXTROSE MONOHYDRATE 5 MG/HR: 50 INJECTION, SOLUTION INTRAVENOUS at 11:16

## 2020-07-16 RX ADMIN — Medication 10 ML: at 21:58

## 2020-07-16 RX ADMIN — PANTOPRAZOLE SODIUM 40 MG: 40 TABLET, DELAYED RELEASE ORAL at 07:59

## 2020-07-16 RX ADMIN — Medication 10 ML: at 05:25

## 2020-07-16 RX ADMIN — ENOXAPARIN SODIUM 90 MG: 100 INJECTION SUBCUTANEOUS at 05:15

## 2020-07-16 RX ADMIN — HYDROXYCHLOROQUINE SULFATE 400 MG: 200 TABLET ORAL at 18:17

## 2020-07-16 RX ADMIN — Medication 10 ML: at 15:22

## 2020-07-16 RX ADMIN — DILTIAZEM HYDROCHLORIDE 30 MG: 30 TABLET, FILM COATED ORAL at 05:15

## 2020-07-16 RX ADMIN — THERA TABS 1 TABLET: TAB at 08:00

## 2020-07-16 RX ADMIN — FOLIC ACID 1 MG: 1 TABLET ORAL at 08:00

## 2020-07-16 RX ADMIN — APIXABAN 5 MG: 5 TABLET, FILM COATED ORAL at 18:17

## 2020-07-16 RX ADMIN — DORZOLAMIDE HYDROCHLORIDE AND TIMOLOL MALEATE 1 DROP: 20; 5 SOLUTION/ DROPS OPHTHALMIC at 18:19

## 2020-07-16 RX ADMIN — DORZOLAMIDE HYDROCHLORIDE AND TIMOLOL MALEATE 1 DROP: 20; 5 SOLUTION/ DROPS OPHTHALMIC at 08:00

## 2020-07-16 NOTE — PROGRESS NOTES
[address] [place] [address]  [phone]      Cardiology Progress Note      7/16/2020 12:00 PM    Admit Date: 7/14/2020    Admit Diagnosis:   Syncope and collapse [R55]    Interval History/Subjective:     Vera Willett is a 69 y.o. male  With PMH HTN, RA, GERD, glaucoma, prostate CA who was admitted for Syncope and collapse [R55]. -remains in a fib, rate uncontrolled  -labs steady  -Mr. Vivi Ocampo is feeling well.   No dizziness, SOB, pain     Visit Vitals  /70   Pulse (!) 112   Temp 97.7 °F (36.5 °C)   Resp 18   Ht 5' 11\" (1.803 m)   Wt 85.2 kg (187 lb 14.4 oz)   SpO2 100%   BMI 26.21 kg/m²       Current Facility-Administered Medications   Medication Dose Route Frequency    dilTIAZem (CARDIZEM) 100 mg in dextrose 5% (MBP/ADV) 100 mL infusion  0-15 mg/hr IntraVENous TITRATE    sodium chloride (NS) flush 5-40 mL  5-40 mL IntraVENous Q8H    sodium chloride (NS) flush 5-40 mL  5-40 mL IntraVENous PRN    LORazepam (ATIVAN) injection 2 mg  2 mg IntraVENous Q1H PRN    LORazepam (ATIVAN) injection 4 mg  4 mg IntraVENous P0A PRN    folic acid (FOLVITE) tablet 1 mg  1 mg Oral DAILY    pantoprazole (PROTONIX) tablet 40 mg  40 mg Oral ACB    enoxaparin (LOVENOX) injection 90 mg  90 mg SubCUTAneous Q12H    thiamine mononitrate (B-1) tablet 100 mg  100 mg Oral DAILY    therapeutic multivitamin (THERAGRAN) tablet 1 Tab  1 Tab Oral DAILY    acetaminophen (TYLENOL) tablet 650 mg  650 mg Oral Q6H PRN    aluminum-magnesium hydroxide (MAALOX) oral suspension 15 mL  15 mL Oral QID PRN    hydrOXYchloroQUINE (PLAQUENIL) tablet 400 mg  400 mg Oral QPM    latanoprost (XALATAN) 0.005 % ophthalmic solution 1 Drop  (Patient Supplied)  1 Drop Left Eye QPM    dorzolamide-timoloL (COSOPT) 22.3-6.8 mg/mL ophthalmic solution 1 Drop  (Patient Supplied)  1 Drop Left Eye BID       Objective:      Physical Exam:  General: pleasant, adult  male appears younger than states age  Heart: irregularRR, no m/S3/JVD, no carotid bruits   Lungs: clear   Abdomen: Soft, +BS, NTND   Extremities: LE david +DP/PT, no edema   Neurologic: Grossly normal  Skin:  Warm and dry. Data Review:   Recent Labs     07/16/20  0515 07/15/20  0405 07/14/20  2200   WBC 6.2 7.2 5.0   HGB 13.4 12.7 11.8*   HCT 38.6 37.3 34.1*    187 173     Recent Labs     07/16/20  0515 07/15/20  0405 07/14/20  2200    139 140   K 3.8 4.3 3.2*   * 110* 109*   CO2 25 23 23   GLU 96 93 90   BUN 9 13 15   CREA 0.73 0.66* 0.74   CA 8.3* 7.4* 6.9*   MG 2.0 2.0 1.6   ALB 3.0* 2.9* 2.5*   TBILI 0.5 0.3 0.2   ALT 19 20 19   INR  --   --  1.2*       Recent Labs     07/16/20  0515 07/15/20  0405 07/14/20  2200   TROIQ <0.05 <0.05 <0.05   CPK  --  77 58   CKMB  --  1.2 1.2         Intake/Output Summary (Last 24 hours) at 7/16/2020 1223  Last data filed at 7/16/2020 0522  Gross per 24 hour   Intake    Output 900 ml   Net -900 ml        Telemetry: a fib 100-130  ECG: a fib  Echocardiogram: EF 50-55%; mild concentric hypertrophy; mild MR. CXRAY: no acute process        Assessment:     Active Problems:    Syncope and collapse (7/15/2020)      Atrial fibrillation ([address] (7/15/2020)        Plan:     A fib with RVR:  Remains in a fib with rates in the 100s. CHADSVASC=2  · Transition to cardizem drip for better rate control  · Plan for ENEDELIA/cardioversion tomorrow. Unable to complete today. NPO after midnight. I discussed the risks/benefits with the patient. Questions answered. He agrees to proceed  · Switch lovenox to eliquis for [facility] [place]    Syncope:  No further dizziness. BP improved with IVF.   Feel most likely 2/2 to hypotension from working outside/ETOH and rapid heart rate with a fib  · BP stable and tolerating cardizem  · Continue to hold home BP meds for now  · Off IVF now  · No significant ectopy other than a fib on tele  · No new concerns on ECHO    Saqib Howell NP DNP, RN, AGACNP-BC      Patient seen and examined by me with nurse practitioner. I personally performed all components of the history, physical, and medical decision making and agree with the assessment and plan as noted. BP better now. IV cardizem for now.      James Callaway MD

## 2020-07-16 NOTE — PROGRESS NOTES
Bedside shift change report given to Brenden Sheppard (oncoming nurse) by Camilo Christie RN (offgoing nurse). Report included the following information SBAR, Kardex, Intake/Output, MAR, Recent Results and Cardiac Rhythm Afib.     1912: Pt sitting in recliner, talking on the phone. 1914: Pt returned to bed. . No complains of pain, no SOB, no dizziness. Pt aymptomatic. BP within pt's normal limits. 2114: Pt resting in bed. .    2200: Pt walked to bathroom, used the urinal. . No complains of pain, no SOB, no dizziness. 2215: Pt resting in bed. .     0515: Pt walked to bathroom. . No complains of pain, no SOB, no dizziness. 0533: Pt in bed resting. .     0549: Cardizem given. Current HR 94. Bedside shift change report given to Camilo CORDOVA (oncoming nurse) by Magalene Rubinstein (offgoing nurse). Report included the following information SBAR, Kardex, Intake/Output, MAR, Recent Results and Cardiac Rhythm Afib.

## 2020-07-16 NOTE — PROGRESS NOTES
Hospitalist Progress Note    NAME: Tariq Lama   :     MRN:  [id number]     Subjective:   Daily Progress Note: 2020 10:27 AM      Chief complaint: admitted with dizziness/syncope/Afib  Seen at bedside case d/w RN, remains in Afib HR jumped to 140 last night with ambulation. He cristin any CP/dizzines/black stools/sob to me.  Per staff he is NPO for possible ENEDELIA/CV today by cards    Current Facility-Administered Medications   Medication Dose Route Frequency    sodium chloride (NS) flush 5-40 mL  5-40 mL IntraVENous Q8H    sodium chloride (NS) flush 5-40 mL  5-40 mL IntraVENous PRN    LORazepam (ATIVAN) injection 2 mg  2 mg IntraVENous Q1H PRN    LORazepam (ATIVAN) injection 4 mg  4 mg IntraVENous P1R PRN    folic acid (FOLVITE) tablet 1 mg  1 mg Oral DAILY    pantoprazole (PROTONIX) tablet 40 mg  40 mg Oral ACB    enoxaparin (LOVENOX) injection 90 mg  90 mg SubCUTAneous Q12H    thiamine mononitrate (B-1) tablet 100 mg  100 mg Oral DAILY    therapeutic multivitamin (THERAGRAN) tablet 1 Tab  1 Tab Oral DAILY    acetaminophen (TYLENOL) tablet 650 mg  650 mg Oral Q6H PRN    aluminum-magnesium hydroxide (MAALOX) oral suspension 15 mL  15 mL Oral QID PRN    dilTIAZem IR (CARDIZEM) tablet 30 mg  30 mg Oral Q6H    hydrOXYchloroQUINE (PLAQUENIL) tablet 400 mg  400 mg Oral QPM    latanoprost (XALATAN) 0.005 % ophthalmic solution 1 Drop  (Patient Supplied)  1 Drop Left Eye QPM    dorzolamide-timoloL (COSOPT) 22.3-6.8 mg/mL ophthalmic solution 1 Drop  (Patient Supplied)  1 Drop Left Eye BID          Objective:     Visit Vitals  /83 (BP 1 Location: Right arm, BP Patient Position: At rest)   Pulse 94   Temp 98.6 °F (37 °C)   Resp 18   Ht 5' 11\" (1.803 m)   Wt 85.2 kg (187 lb 14.4 oz)   SpO2 100%   BMI 26.21 kg/m²      O2 Device: Room air    Temp (24hrs), Av.3 °F (36.8 °C), Min:97.9 °F (36.6 °C), Max:98.6 °F (37 °C)        PHYSICAL EXAM:    Gen: Well-developed, well-nourished, in no acute distress  Resp: No accessory muscle use, clear breath sounds without wheezes rales or rhonchi  Card: No murmurs,  Irregular rhythm, no  peripheral edema  Abd:  Soft, non-tender, non-distended  Musc: No cyanosis or clubbing  Skin: No rashes or ulcers, skin turgor is good  Neuro:  strength is 5/5 bilaterally, hi  follows commands appropriately  Psych:  Good insight, oriented to person, place and time, alert      Data Review    Recent Results (from the past 24 hour(s))   METABOLIC PANEL, COMPREHENSIVE    Collection Time: 07/16/20  5:15 AM   Result Value Ref Range    Sodium 140 136 - 145 mmol/L    Potassium 3.8 3.5 - 5.1 mmol/L    Chloride 110 (H) 97 - 108 mmol/L    CO2 25 21 - 32 mmol/L    Anion gap 5 5 - 15 mmol/L    Glucose 96 65 - 100 mg/dL    BUN 9 6 - 20 MG/DL    Creatinine 0.73 0.70 - 1.30 MG/DL    BUN/Creatinine ratio 12 12 - 20      GFR est AA >60 >60 ml/min/1.73m2    GFR est non-AA >60 >60 ml/min/1.73m2    Calcium 8.3 (L) 8.5 - 10.1 MG/DL    Bilirubin, total 0.5 0.2 - 1.0 MG/DL    ALT (SGPT) 19 12 - 78 U/L    AST (SGOT) 15 15 - 37 U/L    Alk. phosphatase 80 45 - 117 U/L    Protein, total 6.2 (L) 6.4 - 8.2 g/dL    Albumin 3.0 (L) 3.5 - 5.0 g/dL    Globulin 3.2 2.0 - 4.0 g/dL    A-G Ratio 0.9 (L) 1.1 - 2.2     CBC W/O DIFF    Collection Time: 07/16/20  5:15 AM   Result Value Ref Range    WBC 6.2 4.1 - 11.1 K/uL    RBC 4.10 4. 10 - 5.70 M/uL    HGB 13.4 12.1 - 17.0 g/dL    HCT 38.6 36.6 - 50.3 %    MCV 94.1 80.0 - 99.0 FL    MCH 32.7 26.0 - 34.0 PG    MCHC 34.7 30.0 - 36.5 g/dL    RDW 11.9 11.5 - 14.5 %    PLATELET 083 255 - 767 K/uL    MPV 10.5 8.9 - 12.9 FL    NRBC 0.0 0  WBC    ABSOLUTE NRBC 0.00 0.00 - 0.01 K/uL   TROPONIN I    Collection Time: 07/16/20  5:15 AM   Result Value Ref Range    Troponin-I, Qt. <0.05 <0.05 ng/mL   MAGNESIUM    Collection Time: 07/16/20  5:15 AM   Result Value Ref Range    Magnesium 2.0 1.6 - 2.4 mg/dL     No results found for this visit on 07/14/20.   All Micro Results     None           Radiology reports and films for the last 24 hours have been reviewed. Assessment/Plan:     Syncope and collapse POA/new onset atrial fibrillation POA/hypotension POA  - likely due to cardiac arrhythmia  - cardiology on case considering ENEDELIA/CV today  - echocardiogram ok ef 55%, carotid Doppler ok and cardiac enzymes neg x 2  -CT head is unremarkable  - TSH ok  - Lovenox for anticoagulation for now transition to [facility] at d/c  - on  cardizem with holding parameters and monitor HR with ambulation   -Hypotension is currently resolved with IV fluids given in the ER        History of hypertension;  -Hold lisinopril and amlodipine   - on  cardizem monitor BP     Alcoholism:  - drinking 4-5 drinks every day. -  on CIWA protocol but low risk for WD   - thiamine/folate/mvt  -Counseled to decreased amount        History of prostate cancer: In remission      Rheumatoid arthritis: on  hydroxychloroquine      Tobacco abuse: Counseled about tobacco cessation     Code Status: Full code  Surrogate Decision Maker: Wife Jsoe Garcia     DVT Prophylaxis: Lovenox    Baseline:  Independent    Care Plan discussed with: patient, RN  and wife    Dispo: defer to cards likely next 24 hrs if medically stable    Signed By: Benoit Hopkins MD     July 16, 2020

## 2020-07-16 NOTE — PROGRESS NOTES
Orders received, chart reviewed and patient evaluated by physical therapy. Pending progression with skilled acute physical therapy, recommend:  No skilled physical therapy/ follow up rehabilitation needs identified at this time. HR increased to 144bpm from 103bpm post ambulation in the garcia. Recommend with nursing patient to complete as able in order to maintain strength, endurance and independence: OOB to chair 3x/day up ad stefani. Thank you for your assistance. Full evaluation to follow.

## 2020-07-16 NOTE — PHYSICIAN ADVISORY
Letter of Admission Status Determination: Upgrade to Inpatient       Lily Stanton was hospitalized as observation status on 7/14/2020. Patient's hospital stay has already crossed 2 medically necessary midnights; ongoing hospitalization was warranted for this patient with uncontrolled ventricular rate requiring IV medications, further evaluation and close monitoring. Since Lily Stanton required medically necessary hospital care spanning at least two midnights, he has met the benchmark for Inpatient Admission status. Based on the documented clinical condition and care plan, we recommend upgrading patient's hospitalization status to INPATIENT. The final decision regarding the patient's hospitalization status depends on the attending physician's clinical judgment.        Nanci Vides MD, ANGELIC, [address] [facility], [address] [place] [address]  [phone]

## 2020-07-16 NOTE — PROGRESS NOTES
PHYSICAL THERAPY EVALUATION/DISCHARGE  Patient: Tariq Lama (45 y.o. male)  Date: 7/16/2020  Primary Diagnosis: Syncope and collapse [R55]       Precautions:          ASSESSMENT  Based on the objective data described below, the patient presents with baseline mobility. He was able to perform all mobility at an independent level. No LOB noted during session and pt denied any dizziness or symptoms when HR increased to 144bpm post activity. He was returned to the room and left sitting up in the chair at the end of the session. Functional Outcome Measure: The patient scored 100/100 on the barthel outcome measure which is indicative of 0% impaired. Other factors to consider for discharge: a-fib     Further skilled acute physical therapy is not indicated at this time. PLAN :  Recommendation for discharge: (in order for the patient to meet his/her long term goals)  No skilled physical therapy/ follow up rehabilitation needs identified at this time. This discharge recommendation:  Has not yet been discussed the attending provider and/or case management    IF patient discharges home will need the following DME: none       SUBJECTIVE:   Patient stated i am still here. ...    OBJECTIVE DATA SUMMARY:   HISTORY:    Past Medical History:   Diagnosis Date    Arthritis     Hypertension     Ill-defined condition     glaucoma-left eye   History reviewed. No pertinent surgical history. Prior level of function: independent and active  Personal factors and/or comorbidities impacting plan of care:     Home Situation  Home Environment: Private residence  One/Two Story Residence: Two story  Living Alone: No  Support Systems: Spouse/Significant Other/Partner  Patient Expects to be Discharged to[de-identified] Private residence  Current DME Used/Available at Home: None    EXAMINATION/PRESENTATION/DECISION MAKING:   Critical Behavior:   alert and oriented x 4           Hearing:   Auditory  Auditory Impairment: None  Skin: intact  Edema: none  Range Of Motion:  AROM: Within functional limits           PROM: Within functional limits           Strength:    Strength: Within functional limits                    Tone & Sensation:   Tone: Normal              Sensation: Intact               Coordination:  Coordination: Within functional limits  Vision:      Functional Mobility:  Bed Mobility:  Rolling: Independent  Supine to Sit: Independent        Transfers:  Sit to Stand: Independent  Stand to Sit: Independent                       Balance:   Sitting: Intact  Standing: Intact  Ambulation/Gait Training:  Distance (ft): 400 Feet (ft)     Ambulation - Level of Assistance: Independent            Functional Measure:  Barthel Index:    Bathin  Bladder: 10  Bowels: 10  Groomin  Dressing: 10  Feeding: 10  Mobility: 15  Stairs: 10  Toilet Use: 10  Transfer (Bed to Chair and Back): 15  Total: 100/100       The Barthel ADL Index: Guidelines  1. The index should be used as a record of what a patient does, not as a record of what a patient could do. 2. The main aim is to establish degree of independence from any help, physical or verbal, however minor and for whatever reason. 3. The need for supervision renders the patient not independent. 4. A patient's performance should be established using the best available evidence. Asking the patient, friends/relatives and nurses are the usual sources, but direct observation and common sense are also important. However direct testing is not needed. 5. Usually the patient's performance over the preceding 24-48 hours is important, but occasionally longer periods will be relevant. 6. Middle categories imply that the patient supplies over 50 per cent of the effort. 7. Use of aids to be independent is allowed. Simon Lancaster., Barthel, D.W. (4119). Functional evaluation: the Barthel Index. [facility] (14)2. LEIGHA Maldonado, Dangelo Garrison., Alyssa Ritchie, [address] [place] [address] ().  Measuring the change indisability after inpatient rehabilitation; comparison of the responsiveness of the Barthel Index and Functional [place] Measure. Journal of Neurology, Neurosurgery, and Psychiatry, 66(4), 159-292. KASIA Johnson.MARILYN, ISAAC Moy, & Jasper Lorenzo M.A. (2004.) Assessment of post-stroke quality of life in cost-effectiveness studies: The usefulness of the Barthel Index and the EuroQoL-5D. Quality of Life Research, 15, 329-64            Physical Therapy Evaluation Charge Determination   History Examination Presentation Decision-Making   MEDIUM  Complexity : 1-2 comorbidities / personal factors will impact the outcome/ POC  LOW Complexity : 1-2 Standardized tests and measures addressing body structure, function, activity limitation and / or participation in recreation  LOW Complexity : Stable, uncomplicated  Other outcome measures barthel  LOW       Based on the above components, the patient evaluation is determined to be of the following complexity level: LOW         Activity Tolerance:   Good  Please refer to the flowsheet for vital signs taken during this treatment. After treatment patient left in no apparent distress:   Sitting in chair    COMMUNICATION/EDUCATION:   The patients plan of care was discussed with: Registered nurse. Fall prevention education was provided and the patient/caregiver indicated understanding. and Patient/family agree to work toward stated goals and plan of care.     Thank you for this referral.  Lonnie Bowers, PT, DPT   Time Calculation: 14 mins

## 2020-07-16 NOTE — PROGRESS NOTES
Bedside and Verbal shift change report given to [address] (oncoming nurse) by Mary Guzman RN (offgoing nurse). Report included the following information SBAR, Kardex, Intake/Output, MAR, Accordion, Recent Results, Med Rec Status and Cardiac Rhythm a fib.     0745: Patient awake in bed, no complaints of pain, still in a fib uncontrolled. 1100: MD ordered for Dilt gtt. Possible Cardioversion and ENEDELIA tomorrow 7/17/2020. Will monitor pt heart rate and rhythm on dilt gtt. 1116: Dilt gtt started at 5mg/hr. 1520: Dilt gtt titrated up to 10mg/hr.

## 2020-07-17 ENCOUNTER — APPOINTMENT (OUTPATIENT)
Dept: NON INVASIVE DIAGNOSTICS | Age: 75
DRG: 310 | End: 2020-07-17
Attending: NURSE PRACTITIONER
Payer: MEDICARE

## 2020-07-17 VITALS
HEIGHT: 71 IN | HEART RATE: 109 BPM | BODY MASS INDEX: 25.2 KG/M2 | RESPIRATION RATE: 18 BRPM | WEIGHT: 180 LBS | DIASTOLIC BLOOD PRESSURE: 77 MMHG | OXYGEN SATURATION: 96 % | TEMPERATURE: 98.5 F | SYSTOLIC BLOOD PRESSURE: 106 MMHG

## 2020-07-17 PROBLEM — R55 SYNCOPE AND COLLAPSE: Status: RESOLVED | Noted: 2020-07-15 | Resolved: 2020-07-17

## 2020-07-17 LAB
SARS-COV-2, COV2: NOT DETECTED
SOURCE, COVRS: NORMAL
SPECIMEN SOURCE, FCOV2M: NORMAL

## 2020-07-17 PROCEDURE — 74011250636 HC RX REV CODE- 250/636: Performed by: INTERNAL MEDICINE

## 2020-07-17 PROCEDURE — 99153 MOD SED SAME PHYS/QHP EA: CPT

## 2020-07-17 PROCEDURE — 74011250637 HC RX REV CODE- 250/637: Performed by: HOSPITALIST

## 2020-07-17 PROCEDURE — 74011000250 HC RX REV CODE- 250: Performed by: NURSE PRACTITIONER

## 2020-07-17 PROCEDURE — 74011000250 HC RX REV CODE- 250: Performed by: INTERNAL MEDICINE

## 2020-07-17 PROCEDURE — 77030018729 HC ELECTRD DEFIB PAD CARD -B

## 2020-07-17 PROCEDURE — 74011250636 HC RX REV CODE- 250/636: Performed by: NURSE PRACTITIONER

## 2020-07-17 PROCEDURE — 74011250637 HC RX REV CODE- 250/637: Performed by: INTERNAL MEDICINE

## 2020-07-17 PROCEDURE — 99152 MOD SED SAME PHYS/QHP 5/>YRS: CPT

## 2020-07-17 PROCEDURE — 74011250637 HC RX REV CODE- 250/637: Performed by: NURSE PRACTITIONER

## 2020-07-17 RX ORDER — FENTANYL CITRATE 50 UG/ML
12.5-5 INJECTION, SOLUTION INTRAMUSCULAR; INTRAVENOUS
Status: DISCONTINUED | OUTPATIENT
Start: 2020-07-17 | End: 2020-07-17

## 2020-07-17 RX ORDER — ASPIRIN 325 MG/1
100 TABLET, FILM COATED ORAL DAILY
Qty: 30 TAB | Refills: 0 | Status: SHIPPED | OUTPATIENT
Start: 2020-07-18

## 2020-07-17 RX ORDER — THERA TABS 400 MCG
1 TAB ORAL DAILY
Qty: 30 TAB | Refills: 0 | Status: SHIPPED | OUTPATIENT
Start: 2020-07-18

## 2020-07-17 RX ORDER — METOPROLOL TARTRATE 25 MG/1
25 TABLET, FILM COATED ORAL EVERY 12 HOURS
Qty: 60 TAB | Refills: 11 | Status: SHIPPED | OUTPATIENT
Start: 2020-07-17 | End: 2021-07-06 | Stop reason: SDUPTHER

## 2020-07-17 RX ORDER — METOPROLOL TARTRATE 25 MG/1
25 TABLET, FILM COATED ORAL EVERY 12 HOURS
Status: DISCONTINUED | OUTPATIENT
Start: 2020-07-17 | End: 2020-07-17 | Stop reason: HOSPADM

## 2020-07-17 RX ORDER — DILTIAZEM HYDROCHLORIDE 120 MG/1
120 CAPSULE, COATED, EXTENDED RELEASE ORAL DAILY
Qty: 30 CAP | Refills: 11 | Status: SHIPPED | OUTPATIENT
Start: 2020-07-18 | End: 2021-07-06 | Stop reason: SDUPTHER

## 2020-07-17 RX ORDER — DILTIAZEM HYDROCHLORIDE 120 MG/1
120 CAPSULE, COATED, EXTENDED RELEASE ORAL DAILY
Status: DISCONTINUED | OUTPATIENT
Start: 2020-07-17 | End: 2020-07-17 | Stop reason: HOSPADM

## 2020-07-17 RX ORDER — MIDAZOLAM HYDROCHLORIDE 1 MG/ML
.5-2 INJECTION INTRAMUSCULAR; INTRAVENOUS
Status: DISCONTINUED | OUTPATIENT
Start: 2020-07-17 | End: 2020-07-17

## 2020-07-17 RX ORDER — LIDOCAINE HYDROCHLORIDE 20 MG/ML
15 SOLUTION OROPHARYNGEAL AS NEEDED
Status: DISCONTINUED | OUTPATIENT
Start: 2020-07-17 | End: 2020-07-17

## 2020-07-17 RX ADMIN — THERA TABS 1 TABLET: TAB at 12:43

## 2020-07-17 RX ADMIN — METOPROLOL TARTRATE 25 MG: 25 TABLET, FILM COATED ORAL at 12:43

## 2020-07-17 RX ADMIN — MIDAZOLAM HYDROCHLORIDE 1 MG: 1 INJECTION, SOLUTION INTRAMUSCULAR; INTRAVENOUS at 11:33

## 2020-07-17 RX ADMIN — FENTANYL CITRATE 25 MCG: 50 INJECTION, SOLUTION INTRAMUSCULAR; INTRAVENOUS at 11:14

## 2020-07-17 RX ADMIN — THIAMINE HCL TAB 100 MG 100 MG: 100 TAB at 12:43

## 2020-07-17 RX ADMIN — DEXTROSE MONOHYDRATE 2.5 MG/HR: 50 INJECTION, SOLUTION INTRAVENOUS at 04:54

## 2020-07-17 RX ADMIN — MIDAZOLAM HYDROCHLORIDE 1 MG: 1 INJECTION, SOLUTION INTRAMUSCULAR; INTRAVENOUS at 11:21

## 2020-07-17 RX ADMIN — Medication 10 ML: at 12:46

## 2020-07-17 RX ADMIN — FENTANYL CITRATE 25 MCG: 50 INJECTION, SOLUTION INTRAMUSCULAR; INTRAVENOUS at 11:31

## 2020-07-17 RX ADMIN — FOLIC ACID 1 MG: 1 TABLET ORAL at 12:43

## 2020-07-17 RX ADMIN — FENTANYL CITRATE 25 MCG: 50 INJECTION, SOLUTION INTRAMUSCULAR; INTRAVENOUS at 11:21

## 2020-07-17 RX ADMIN — FENTANYL CITRATE 25 MCG: 50 INJECTION, SOLUTION INTRAMUSCULAR; INTRAVENOUS at 11:33

## 2020-07-17 RX ADMIN — LIDOCAINE HYDROCHLORIDE 15 ML: 20 SOLUTION ORAL; TOPICAL at 11:08

## 2020-07-17 RX ADMIN — DILTIAZEM HYDROCHLORIDE 120 MG: 120 CAPSULE, COATED, EXTENDED RELEASE ORAL at 12:42

## 2020-07-17 RX ADMIN — Medication 10 ML: at 06:17

## 2020-07-17 RX ADMIN — BENZOCAINE, BUTAMBEN, AND TETRACAINE HYDROCHLORIDE 1 SPRAY: .028; .004; .004 AEROSOL, SPRAY TOPICAL at 11:10

## 2020-07-17 RX ADMIN — APIXABAN 5 MG: 5 TABLET, FILM COATED ORAL at 12:43

## 2020-07-17 RX ADMIN — DORZOLAMIDE HYDROCHLORIDE AND TIMOLOL MALEATE 1 DROP: 20; 5 SOLUTION/ DROPS OPHTHALMIC at 08:35

## 2020-07-17 RX ADMIN — MIDAZOLAM HYDROCHLORIDE 1 MG: 1 INJECTION, SOLUTION INTRAMUSCULAR; INTRAVENOUS at 11:16

## 2020-07-17 RX ADMIN — MIDAZOLAM HYDROCHLORIDE 1 MG: 1 INJECTION, SOLUTION INTRAMUSCULAR; INTRAVENOUS at 11:15

## 2020-07-17 RX ADMIN — MIDAZOLAM HYDROCHLORIDE 1 MG: 1 INJECTION, SOLUTION INTRAMUSCULAR; INTRAVENOUS at 11:31

## 2020-07-17 RX ADMIN — ACETAMINOPHEN 650 MG: 325 TABLET ORAL at 03:28

## 2020-07-17 NOTE — PROGRESS NOTES
Hospitalist Progress Note    NAME: Paula Cole   :     MRN:  [id number]     Subjective:   Daily Progress Note: 2020 10:27 AM      Chief complaint: admitted with dizziness/syncope/Afib  Seen at bedside case d/w RN,  For ENEDELIA/CV today. He is frustrated and would like to go home today if possible    Current Facility-Administered Medications   Medication Dose Route Frequency    midazolam (PF) (VERSED) injection 0.5-2 mg  0.5-2 mg IntraVENous Multiple    fentaNYL citrate (PF) injection 12.5-50 mcg  12.5-50 mcg IntraVENous Multiple    lidocaine (XYLOCAINE) 2 % viscous solution 15 mL  15 mL Mouth/Throat PRN    butamben-tetracaine-benzocaine (CETACAINE) 2 %-2 %-14 % (200 mg/sec) topical spray 1 Spray  1 Spray Topical ONCE PRN    dilTIAZem (CARDIZEM) 100 mg in dextrose 5% (MBP/ADV) 100 mL infusion  0-15 mg/hr IntraVENous TITRATE    apixaban (ELIQUIS) tablet 5 mg  5 mg Oral BID    sodium chloride (NS) flush 5-40 mL  5-40 mL IntraVENous Q8H    sodium chloride (NS) flush 5-40 mL  5-40 mL IntraVENous PRN    LORazepam (ATIVAN) injection 2 mg  2 mg IntraVENous Q1H PRN    LORazepam (ATIVAN) injection 4 mg  4 mg IntraVENous T1F PRN    folic acid (FOLVITE) tablet 1 mg  1 mg Oral DAILY    pantoprazole (PROTONIX) tablet 40 mg  40 mg Oral ACB    thiamine mononitrate (B-1) tablet 100 mg  100 mg Oral DAILY    therapeutic multivitamin (THERAGRAN) tablet 1 Tab  1 Tab Oral DAILY    acetaminophen (TYLENOL) tablet 650 mg  650 mg Oral Q6H PRN    aluminum-magnesium hydroxide (MAALOX) oral suspension 15 mL  15 mL Oral QID PRN    hydrOXYchloroQUINE (PLAQUENIL) tablet 400 mg  400 mg Oral QPM    latanoprost (XALATAN) 0.005 % ophthalmic solution 1 Drop  (Patient Supplied)  1 Drop Left Eye QPM    dorzolamide-timoloL (COSOPT) 22.3-6.8 mg/mL ophthalmic solution 1 Drop  (Patient Supplied)  1 Drop Left Eye BID          Objective:     Visit Vitals  /87 (BP 1 Location: Left arm, BP Patient Position:  At rest)   Pulse (!) 113   Temp 98.5 °F (36.9 °C)   Resp 17   Ht 5' 11\" (1.803 m)   Wt 81.6 kg (180 lb)   SpO2 99%   BMI 25.10 kg/m²    O2 Flow Rate (L/min): 2 l/min O2 Device: Nasal cannula    Temp (24hrs), Av.2 °F (36.8 °C), Min:97.7 °F (36.5 °C), Max:98.7 °F (37.1 °C)        PHYSICAL EXAM:    Gen: Well-developed, well-nourished, in no acute distress  Resp: No accessory muscle use, clear breath sounds without wheezes rales or rhonchi  Card: No murmurs,  Irregular rhythm, no  peripheral edema  Abd:  Soft, non-tender, non-distended  Musc: No cyanosis or clubbing  Skin: No rashes or ulcers, skin turgor is good  Neuro:   follows commands appropriately, no focal defecits  Psych:  Flat affect today      Data Review    Recent Results (from the past 24 hour(s))   SARS-COV-2    Collection Time: 20  7:47 PM   Result Value Ref Range    Specimen source Nasopharyngeal      SARS-CoV-2 Not detected NOTD      Specimen source Nasopharyngeal       No results found for this visit on 20. All Micro Results     None           Radiology reports and films for the last 24 hours have been reviewed. Assessment/Plan:     Syncope and collapse POA/new onset atrial fibrillation POA/hypotension POA  - likely due to cardiac arrhythmia  - cardiology to do ENEDELIA/CV today  - echocardiogram ok ef 55%, carotid Doppler ok and cardiac enzymes neg x 2  -CT head is unremarkable  - TSH ok  - on eliquis for AC  - wean cardizem drip        History of hypertension;  -Hold lisinopril and amlodipine   - on  cardizem drip      Alcoholism:  - drinking 4-5 drinks every day. -  on CIWA protocol but low risk for WD   - thiamine/folate/mvt  -Counseled to decreased amount        History of prostate cancer: In remission      Rheumatoid arthritis: on  hydroxychloroquine      Tobacco abuse: Counseled about tobacco cessation     Code Status: Full code  Surrogate Decision Maker: Wife Meggan Patel     DVT Prophylaxis: Lovenox    Baseline:  Sunita Fowler 80 discussed with: patient, RN       Dispo: defer to cards  Possible today after ENEDELIA or tomorrow if medically stable     Signed By: Alfredo Al MD     July 17, 2020

## 2020-07-17 NOTE — PROGRESS NOTES
Pt sedated with 3mg Versed and 50mcg Fentanyl for ENEDELIA (monitored sedation from 1113 to 1140)    Pt sedated with an additional 2mg Versed and 50mcg Fentanyl, given 1 synchronized shock(s) at 360 Joules, Afib converted to NSR to Afib. (monitored sedation from 1113 to 1140)

## 2020-07-17 NOTE — PROGRESS NOTES
Reviewed discharge instructions and prescriptions with patient and spouse. Patient verbalizes understanding, no questions at this time. Patient taken to [facility] lobby via wheelchair for discharge.

## 2020-07-17 NOTE — DISCHARGE SUMMARY
Hospitalist Discharge Summary     Patient ID:  Brooksie Collet  [id number]  35 y.o.  1945 7/14/2020    PCP on record: Champ Agosto MD    Admit date: 7/14/2020  Discharge date and time: 7/17/2020    DISCHARGE DIAGNOSIS:  Afib w RVR  Sncope  HTN  ETOH use  Rheumatoid Arthritis    CONSULTATIONS:  IP CONSULT TO CARDIOLOGY    Excerpted HPI from H&P of Renan Reynoso MD:  Kendall Sierra y. o. male with PMHx significant for hypertension, rheumatoid arthritis, GERD, glaucoma, and prostate cancer presents to the ED with a chief complaint of a syncopal episode tonight.  Patient reports that he was feeling well through the day but Tonight after he ate dinner and drank several alcoholic drinks (7-2). RE suddenly became dizzy and felt disoriented.  His wife says that he slumped over for proximately 2 to 5 minutes and was not responding to her. [facility] then became more responsive, but was mumbling and she was having a difficult time understanding his speech.  Patient states at that time he started feeling like he was sweating profusely and became more dizzy.  He describes his dizziness as room spinning and not lightheaded feeling.  He developed nausea and several episodes of nonbloody nonbilious emesis.  EMS was called and found patient to be in A. fib with blood pressures that were in the 52R to 63Y systolic.  Patient denies any fall or head injury.  He states he was sitting in his chair the whole time that these episodes were occurring. [facility] denies any numbness, tingling, weakness, fever, chills, chest pain, shortness of breath.  He reports a cough, but attributes it to his 1 pack/day smoking.  Patient admits that he drinks 4-5 drinks every night.  He denies any dysuria or hematuria, but does report nocturia. [facility] has never had a similar episode and does not have a history of atrial fibrillation.  He states that right now his dizziness is improved from when he was at home.   Patient denies any chest pain, shortness of breath or subjective feeling of palpitation  We were asked to admit for work up and evaluation of the above problems. ______________________________________________________________________  DISCHARGE SUMMARY/HOSPITAL COURSE:  for full details see H&P, daily progress notes, labs, consult notes. Syncope and collapse POA/new onset atrial fibrillation POA/hypotension POA  - likely due to cardiac arrhythmia  - cardiology did ENEDELIA/CV 7/17/2/20 but failed remains in afib goal is to get HR control with po cardizem and lopressor and f/u as OP with Dr. Anay Dickson  - echocardiogram ok ef 55%, carotid Doppler ok and cardiac enzymes neg x 2  -CT head is unremarkable  - TSH ok  - on eliquis for AC risk of bleeding from [facility] explained to patient and wife who verbalized understanding        History of hypertension;  -Hold lisinopril and amlodipine  At home  - on  cardizem and lopressor at d/c     Alcoholism:  - drinking 4-5 drinks every day. -  on CIWA protocol but low risk for WD   - thiamine at home  -Counseled to decreased amount        History of prostate cancer: In remission      Rheumatoid arthritis: on  hydroxychloroquine      Tobacco abuse: Counseled about tobacco cessation      Dispo: per cards ok to go home today and f/u Dr. Anay Dickson as OP. Patient eager to go home and  Maximized inpatient stay. Patient and wife verbalized understanding of discharge plan and instructions. _______________________________________________________________________  Patient seen and examined by me on discharge day.    _______________________________________________________________________  DISCHARGE MEDICATIONS:   Current Discharge Medication List      START taking these medications    Details   apixaban (ELIQUIS) 5 mg tablet Take 1 Tab by mouth two (2) times a day. Indications: treatment to prevent a blood clot in the lung  Qty: 60 Tab, Refills: 11      dilTIAZem ER (CARDIZEM CD) 120 mg capsule Take 1 Cap by mouth daily.   Qty: 30 Cap, Refills: 11      metoprolol tartrate (LOPRESSOR) 25 mg tablet Take 1 Tab by mouth every twelve (12) hours. Qty: 60 Tab, Refills: 11      thiamine mononitrate (B-1) 100 mg tablet Take 1 Tab by mouth daily. Qty: 30 Tab, Refills: 0      therapeutic multivitamin (THERAGRAN) tablet Take 1 Tab by mouth daily. Qty: 30 Tab, Refills: 0         CONTINUE these medications which have NOT CHANGED    Details   hydrOXYchloroQUINE (PLAQUENIL) 200 mg tablet Take 400 mg by mouth daily. glucosamine-chondroitin (ARTHX) 500-400 mg cap Take 1 Cap by mouth daily.          STOP taking these medications       LISINOPRIL PO Comments:   Reason for Stopping:         amlodipine besylate (AMLODIPINE PO) Comments:   Reason for Stopping:                 Patient Follow Up Instructions:   Diet cardiac  Activity slowly advance as tolerated avoid heavy exercise, wt lifting  Check cbc/bmp 1 week at pcp office  Return to ER or call PCP immediately if symptoms gets worse or reoccur  Hold previous BP meds until seen at cards office  Stop ETOH and smoking   Take Multivitamin daily    Follow-up Information     Follow up With Specialties Details Why Lindell Dakins, MD Cardiology, [address] [place] [address] Vascular Surgery, Internal Medicine Schedule an appointment as soon as possible for a visit will have the office reach out to you with an appointment for follow up with our rhythm specialist cardiologist Tia Bright 316 [phone]      Elaine Dominguez MD Family Practice In 1 week          ________________________________________________________________      Condition at Discharge:  Stable  __________________________________________________________________    Disposition home w family   ____________________________________________________________________    Code Status:  full  ___________________________________________________________________      Total time in minutes spent coordinating this discharge (includes going over instructions, follow-up, prescriptions) : 37 minutes    Signed:  Renan Hdez MD

## 2020-07-17 NOTE — PROGRESS NOTES
[address], [place], [address] [facility]  [phone]      Cardiology Progress Note      7/17/2020 1 PM    Admit Date: 7/14/2020    Admit Diagnosis:   Syncope and collapse [R55]  Syncope and collapse [R55]    Interval History/Subjective:     Marina Livingston is a 69 y.o. male  With PMH HTN, RA, GERD, glaucoma, prostate CA who was admitted for Syncope and collapse [R55]. -s/p cardioversion. Converted to SR and then went back to afib  -covid swab negative  -Mr. Juliann Vazquez is feeling well post ENEDELIA/cardioversion.   He denies chest pain or SOB    Visit Vitals  /77   Pulse (!) 109   Temp 98.5 °F (36.9 °C)   Resp 18   Ht 5' 11\" (1.803 m)   Wt 81.6 kg (180 lb)   SpO2 96%   BMI 25.10 kg/m²       Current Facility-Administered Medications   Medication Dose Route Frequency    dilTIAZem ER (CARDIZEM CD) capsule 120 mg  120 mg Oral DAILY    metoprolol tartrate (LOPRESSOR) tablet 25 mg  25 mg Oral Q12H    apixaban (ELIQUIS) tablet 5 mg  5 mg Oral BID    sodium chloride (NS) flush 5-40 mL  5-40 mL IntraVENous Q8H    sodium chloride (NS) flush 5-40 mL  5-40 mL IntraVENous PRN    LORazepam (ATIVAN) injection 2 mg  2 mg IntraVENous Q1H PRN    LORazepam (ATIVAN) injection 4 mg  4 mg IntraVENous E8M PRN    folic acid (FOLVITE) tablet 1 mg  1 mg Oral DAILY    pantoprazole (PROTONIX) tablet 40 mg  40 mg Oral ACB    thiamine mononitrate (B-1) tablet 100 mg  100 mg Oral DAILY    therapeutic multivitamin (THERAGRAN) tablet 1 Tab  1 Tab Oral DAILY    acetaminophen (TYLENOL) tablet 650 mg  650 mg Oral Q6H PRN    aluminum-magnesium hydroxide (MAALOX) oral suspension 15 mL  15 mL Oral QID PRN    hydrOXYchloroQUINE (PLAQUENIL) tablet 400 mg  400 mg Oral QPM    latanoprost (XALATAN) 0.005 % ophthalmic solution 1 Drop  (Patient Supplied)  1 Drop Left Eye QPM    dorzolamide-timoloL (COSOPT) 22.3-6.8 mg/mL ophthalmic solution 1 Drop  (Patient Supplied)  1 Drop Left Eye BID       Objective:      Physical Exam:  General: pleasant, adult  male appears younger than states age  Heart: irregularRR, no m/S3/JVD  Lungs: clear   Abdomen: Soft, +BS, NTND   Extremities: LE david +DP/PT, no edema   Neurologic: Grossly normal  Skin:  Warm and dry. Data Review:   Recent Labs     07/16/20  0515 07/15/20  0405 07/14/20  2200   WBC 6.2 7.2 5.0   HGB 13.4 12.7 11.8*   HCT 38.6 37.3 34.1*    187 173     Recent Labs     07/16/20  0515 07/15/20  0405 07/14/20  2200    139 140   K 3.8 4.3 3.2*   * 110* 109*   CO2 25 23 23   GLU 96 93 90   BUN 9 13 15   CREA 0.73 0.66* 0.74   CA 8.3* 7.4* 6.9*   MG 2.0 2.0 1.6   ALB 3.0* 2.9* 2.5*   TBILI 0.5 0.3 0.2   ALT 19 20 19   INR  --   --  1.2*       Recent Labs     07/16/20  0515 07/15/20  0405 07/14/20  2200   TROIQ <0.05 <0.05 <0.05   CPK  --  77 58   CKMB  --  1.2 1.2         Intake/Output Summary (Last 24 hours) at 7/17/2020 1251  Last data filed at 7/17/2020 0454  Gross per 24 hour   Intake 209.49 ml   Output 200 ml   Net 9.49 ml        Telemetry: a fib 100-120  ECG: a fib  Echocardiogram: EF 50-55%; mild concentric hypertrophy; mild MR. CXRAY: no acute process        Assessment:     Active Problems:    Syncope and collapse (7/15/2020)      Atrial fibrillation ([address] (7/15/2020)        Plan:     A fib with RVR:  Remains in a fib s/p cardioversion with a transient return to SR. CHADSVASC=2  · cardizem had been down to 2.5. Transition to 120mg PO cardizem and add metoprolol   · Eliquis for [facility]    Syncope:  No further dizziness. BP improved with IVF. Feel most likely 2/2 to hypotension from working outside/ETOH and rapid heart rate with a fib  · BP stable   · Continue to hold home BP meds since now on other agents to help with a fib. · No significant ectopy other than a fib on tele  · No new concerns on ECHO    Patient okay for discharge home from cardiology perspective after recovering from ENEDELIA/cardioversion if VS remain stable.   Medications as above unless unable to control rate. Will arrange out patient evaluation with Dr. Lalit Rivero, JOVANNA  DNP, RN, [facility]      Patient seen and examined by me with nurse practitioner. I personally performed all components of the history, physical, and medical decision making and agree with the assessment and plan as noted. Stayed in SR for few minutes after cardioversion and reverted back to a. Fib. Will add meds as above and will have him f/u with Dr. Evy Herrmann as out pt to consider ablation.      Mayela Mccallum MD

## 2020-07-17 NOTE — PROGRESS NOTES
TR:  1) Home with f/u appts  2) 2nd IM letter at d/c  3) Substance use resources  4) Wife to transport at d/c    2:48 PM: Discharge order noted. Medicare pt has received, reviewed, and signed 2nd IM letter informing them of their right to appeal the discharge. Signed copy has been placed on pt bedside chart. CM spoke with pt and he would like to make his own PCP follow up appointment. He also reported that his cardiology follow up was being set up for him. No further needs identified. Pt is okay to discharge from a CM standpoint. Care Management Interventions  PCP Verified by CM: Yes  Mode of Transport at Discharge: Other (see comment)(wife )  Transition of Care Consult (CM Consult): Other(home with OP f/u appts)  Discharge Durable Medical Equipment: No  Physical Therapy Consult: Yes  Occupational Therapy Consult: No  Speech Therapy Consult: No  Current Support Network: Lives with Spouse, Own Home, Family Lives Nearby  Confirm Follow Up Transport: Self  The Patient and/or Patient Representative was Provided with a Choice of Provider and Agrees with the Discharge Plan?: Yes  Freedom of Choice List was Provided with Basic Dialogue that Supports the Patient's Individualized Plan of Care/Goals, Treatment Preferences and Shares the Quality Data Associated with the Providers?: Yes   Resource Information Provided?: No  Discharge Location  Discharge Placement: Home with outpatient services      10:01 AM: [facility] offered substance use resources to pt who declined them. Pt to have ENEDELIA/cardioversion shortly and may discharge today after procedure if he is feeling well otherwise he will likely discharge tomorrow.      Branden Humphrey RN ,[facility]  [phone]

## 2020-07-17 NOTE — PROGRESS NOTES
Patient arrived to Non-Invasive Cardiology Lab for In Patient ENEDELIA/Cardioversion Procedure. Staff introduced to patient. Patient identifiers verified with Name and Date of Birth. Procedure verified with patient. Consent forms reviewed and signed by patient or authorized representative and verified. Allergies verified. Patient informed of procedure and plan of care. Questions answered with review. Patient on cardiac monitor, non-invasive blood pressure, SPO2 monitor. On 2L NC. Patient is A&Ox3. Patient reports no complaints. Patient on stretcher, in low position, with side rails up. Patient instructed to call for assistance as needed.

## 2020-07-17 NOTE — DISCHARGE INSTRUCTIONS
Diet cardiac  Activity slowly advance as tolerated avoid heavy exercise, wt lifting  Check cbc/bmp 1 week at pcp office  Return to ER or call PCP immediately if symptoms gets worse or reoccur  Hold previous BP meds until seen at cards office  Stop ETOH and smoking  Take multivitamin daily

## 2020-07-17 NOTE — PROGRESS NOTES
1900 - Bedside and Verbal shift change report given to Elena Ferrell RN and ELLIS Cuevas (oncoming nurse) by Lary Spicer RN (offgoing nurse). Report included the following information SBAR, Kardex, Intake/Output, MAR, Recent Results and Cardiac Rhythm Afib. Pt on Dilt gtt at 10 mg/hr, HR 60-70's. Call bell within reach. Will continue to monitor. Pt will be NPO at midnight. 0000 - Pt NPO.     0328 - Pt treated with tylenol for pain 7/10 in left shoulder, states he always has this pain and tylenol helps. Will continue to monitor. 0700 - Shift report given to ELLIS Connelly with ELLIS Cuevas.

## 2020-08-11 ENCOUNTER — OFFICE VISIT (OUTPATIENT)
Dept: CARDIOLOGY CLINIC | Age: 75
End: 2020-08-11
Payer: MEDICARE

## 2020-08-11 ENCOUNTER — CLINICAL SUPPORT (OUTPATIENT)
Dept: CARDIOLOGY CLINIC | Age: 75
End: 2020-08-11
Payer: MEDICARE

## 2020-08-11 VITALS
RESPIRATION RATE: 18 BRPM | BODY MASS INDEX: 25.06 KG/M2 | DIASTOLIC BLOOD PRESSURE: 70 MMHG | OXYGEN SATURATION: 100 % | WEIGHT: 179 LBS | SYSTOLIC BLOOD PRESSURE: 140 MMHG | HEIGHT: 71 IN | HEART RATE: 66 BPM

## 2020-08-11 DIAGNOSIS — Z72.0 TOBACCO ABUSE: ICD-10-CM

## 2020-08-11 DIAGNOSIS — F10.10 ETOH ABUSE: ICD-10-CM

## 2020-08-11 DIAGNOSIS — I65.23 BILATERAL CAROTID ARTERY STENOSIS: Primary | ICD-10-CM

## 2020-08-11 DIAGNOSIS — I48.0 PAROXYSMAL ATRIAL FIBRILLATION (HCC): ICD-10-CM

## 2020-08-11 DIAGNOSIS — I10 ESSENTIAL HYPERTENSION: ICD-10-CM

## 2020-08-11 DIAGNOSIS — I48.0 PAF (PAROXYSMAL ATRIAL FIBRILLATION) (HCC): Primary | ICD-10-CM

## 2020-08-11 PROCEDURE — 93005 ELECTROCARDIOGRAM TRACING: CPT | Performed by: INTERNAL MEDICINE

## 2020-08-11 PROCEDURE — 99204 OFFICE O/P NEW MOD 45 MIN: CPT | Performed by: INTERNAL MEDICINE

## 2020-08-11 PROCEDURE — 1101F PT FALLS ASSESS-DOCD LE1/YR: CPT | Performed by: INTERNAL MEDICINE

## 2020-08-11 PROCEDURE — 1111F DSCHRG MED/CURRENT MED MERGE: CPT | Performed by: INTERNAL MEDICINE

## 2020-08-11 PROCEDURE — G8536 NO DOC ELDER MAL SCRN: HCPCS | Performed by: INTERNAL MEDICINE

## 2020-08-11 PROCEDURE — 3017F COLORECTAL CA SCREEN DOC REV: CPT | Performed by: INTERNAL MEDICINE

## 2020-08-11 PROCEDURE — G8420 CALC BMI NORM PARAMETERS: HCPCS | Performed by: INTERNAL MEDICINE

## 2020-08-11 PROCEDURE — G8427 DOCREV CUR MEDS BY ELIG CLIN: HCPCS | Performed by: INTERNAL MEDICINE

## 2020-08-11 PROCEDURE — 93010 ELECTROCARDIOGRAM REPORT: CPT | Performed by: INTERNAL MEDICINE

## 2020-08-11 PROCEDURE — 93228 REMOTE 30 DAY ECG REV/REPORT: CPT | Performed by: INTERNAL MEDICINE

## 2020-08-11 PROCEDURE — G8432 DEP SCR NOT DOC, RNG: HCPCS | Performed by: INTERNAL MEDICINE

## 2020-08-11 RX ORDER — DORZOLAMIDE HYDROCHLORIDE AND TIMOLOL MALEATE 20; 5 MG/ML; MG/ML
1 SOLUTION/ DROPS OPHTHALMIC 2 TIMES DAILY
COMMUNITY
Start: 2020-07-25

## 2020-08-11 RX ORDER — LATANOPROST 50 UG/ML
1 SOLUTION/ DROPS OPHTHALMIC
COMMUNITY

## 2020-08-11 RX ORDER — ACYCLOVIR 400 MG/1
400 TABLET ORAL DAILY
COMMUNITY
Start: 2020-06-29

## 2020-08-11 NOTE — Clinical Note
8/11/20 Patient: Vera Willett YOB: 1945 Date of Visit: 8/11/2020 Ed Kanner, MD 
VIA Dear Ed Kanner, MD, Thank you for referring Mr. Vera Willett to [facility] for evaluation. My notes for this consultation are attached. If you have questions, please do not hesitate to call me. I look forward to following your patient along with you. Sincerely, Wilmer Bowens MD

## 2020-08-11 NOTE — PROGRESS NOTES
Subjective:      Ml Peters is a 69 y.o. male is here for EP consult. The patient denies chest pain/ shortness of breath, orthopnea, PND, LE edema, palpitations, syncope, presyncope or fatigue. Patient Active Problem List    Diagnosis Date Noted    Atrial fibrillation ([address] 07/15/2020    Bilateral carotid artery stenosis 07/15/2020      Tyrone Costello MD  Past Medical History:   Diagnosis Date    Arthritis     Hypertension     Ill-defined condition     glaucoma-left eye      No past surgical history on file. No Known Allergies   No family history on file. negative for cardiac disease  Social History     Socioeconomic History    Marital status:      Spouse name: Not on file    Number of children: Not on file    Years of education: Not on file    Highest education level: Not on file   Tobacco Use    Smoking status: Current Every Day Smoker     Packs/day: 1.00    Smokeless tobacco: Never Used   Substance and Sexual Activity    Alcohol use: Yes    Drug use: Not Currently     Current Outpatient Medications   Medication Sig    acyclovir (ZOVIRAX) 400 mg tablet Take 400 mg by mouth daily.  dorzolamide-timoloL (COSOPT) 22.3-6.8 mg/mL ophthalmic solution Administer 1 Drop to left eye two (2) times a day.  latanoprost (XALATAN) 0.005 % ophthalmic solution Administer 1 Drop to left eye nightly.  vit C/E/Zn/coppr/lutein/zeaxan (PRESERVISION AREDS-2 PO) Take 1 Cap by mouth two (2) times a day.  dilTIAZem ER (CARDIZEM CD) 120 mg capsule Take 1 Cap by mouth daily.  metoprolol tartrate (LOPRESSOR) 25 mg tablet Take 1 Tab by mouth every twelve (12) hours.  thiamine mononitrate (B-1) 100 mg tablet Take 1 Tab by mouth daily.  therapeutic multivitamin (THERAGRAN) tablet Take 1 Tab by mouth daily.  hydrOXYchloroQUINE (PLAQUENIL) 200 mg tablet Take 400 mg by mouth daily.  glucosamine-chondroitin (ARTHX) 500-400 mg cap Take 1 Cap by mouth daily.     apixaban (ELIQUIS) 5 mg tablet Take 1 Tab by mouth two (2) times a day. Indications: treatment to prevent a blood clot in the lung     No current facility-administered medications for this visit. Vitals:    08/11/20 1049   BP: 140/70   Pulse: 66   Resp: 18   SpO2: 100%   Weight: 179 lb (81.2 kg)   Height: 5' 11\" (1.803 m)       I have reviewed the nurses notes, vitals, problem list, allergy list, medical history, family, social history and medications. Review of Symptoms:    General: Pt denies excessive weight gain or loss. Pt is able to conduct ADL's  HEENT: Denies blurred vision, headaches, hearing loss, epistaxis and difficulty swallowing. Respiratory: Denies cough, congestion, shortness of breath, ROBERT, wheezing or stridor. Cardiovascular: Denies precordial pain, palpitations, edema or PND  Gastrointestinal: Denies poor appetite, indigestion, abdominal pain or blood in stool  Genitourinary: Denies hematuria, dysuria, increased urinary frequency  Musculoskeletal: Denies joint pain or swelling from muscles or joints  Neurologic: Denies tremor, paresthesias, headache, or sensory motor disturbance  Psychiatric: Denies confusion, insomnia, depression  Integumentray: Denies rash, itching or ulcers. Hematologic: Denies easy bruising, bleeding    Physical Exam:      General: Well developed, in no acute distress. HEENT: Eyes - PERRL, no jvd  Heart:  Normal S1/S2 negative S3 or S4. Regular, no murmur, gallop or rub. Respiratory: Clear bilaterally x 4, no wheezing or rales  Abdomen:   Soft, non-tender, bowel sounds are active. Extremities:  No edema, normal cap refill, no cyanosis. Musculoskeletal: No clubbing  Neuro: A&Ox3, speech clear, gait stable. Skin: Skin color is normal. No rashes or lesions.  Non diaphoretic, no ulcers or subcutaneous nodule  Vascular: 2+ pulses symmetric in all extremities  Psych - judgement intact and orientation is wnl     Cardiographics    Ekg: nsr    Results for orders placed or performed during the hospital encounter of 07/14/20   EKG, 12 LEAD, INITIAL   Result Value Ref Range    Ventricular Rate 75 BPM    Atrial Rate 234 BPM    QRS Duration 70 ms    Q-T Interval 408 ms    QTC Calculation (Bezet) 455 ms    Calculated R Axis -18 degrees    Calculated T Axis -20 degrees    Diagnosis       Atrial fibrillation  Minimal voltage criteria for LVH, may be normal variant    Confirmed by Jasmin Jasmine ([id number]) on 7/16/2020 8:51:03 AM           Lab Results   Component Value Date/Time    WBC 6.2 07/16/2020 05:15 AM    HGB 13.4 07/16/2020 05:15 AM    HCT 38.6 07/16/2020 05:15 AM    PLATELET 237 48/07/2526 05:15 AM    MCV 94.1 07/16/2020 05:15 AM      Lab Results   Component Value Date/Time    Sodium 140 07/16/2020 05:15 AM    Potassium 3.8 07/16/2020 05:15 AM    Chloride 110 (H) 07/16/2020 05:15 AM    CO2 25 07/16/2020 05:15 AM    Anion gap 5 07/16/2020 05:15 AM    Glucose 96 07/16/2020 05:15 AM    BUN 9 07/16/2020 05:15 AM    Creatinine 0.73 07/16/2020 05:15 AM    BUN/Creatinine ratio 12 07/16/2020 05:15 AM    GFR est AA >60 07/16/2020 05:15 AM    GFR est non-AA >60 07/16/2020 05:15 AM    Calcium 8.3 (L) 07/16/2020 05:15 AM    Bilirubin, total 0.5 07/16/2020 05:15 AM    Alk. phosphatase 80 07/16/2020 05:15 AM    Protein, total 6.2 (L) 07/16/2020 05:15 AM    Albumin 3.0 (L) 07/16/2020 05:15 AM    Globulin 3.2 07/16/2020 05:15 AM    A-G Ratio 0.9 (L) 07/16/2020 05:15 AM    ALT (SGPT) 19 07/16/2020 05:15 AM         Assessment:     Assessment:        ICD-10-CM ICD-9-CM    1. Bilateral carotid artery stenosis  I65.23 433.10 AMB POC EKG ROUTINE W/ 12 LEADS, INTER & REP     433.30    2. Paroxysmal atrial fibrillation (HCC)  I48.0 427.31    3. Essential hypertension  I10 401.9    4. ETOH abuse  F10.10 305.00    5.  Tobacco abuse  Z72.0 305.1      Orders Placed This Encounter    AMB POC EKG ROUTINE W/ 12 LEADS, INTER & REP     Order Specific Question:   Reason for Exam:     Answer:   ROUTINE    acyclovir (ZOVIRAX) 400 mg tablet     Sig: Take 400 mg by mouth daily.  dorzolamide-timoloL (COSOPT) 22.3-6.8 mg/mL ophthalmic solution     Sig: Administer 1 Drop to left eye two (2) times a day.  latanoprost (XALATAN) 0.005 % ophthalmic solution     Sig: Administer 1 Drop to left eye nightly.  vit C/E/Zn/coppr/lutein/zeaxan (PRESERVISION AREDS-2 PO)     Sig: Take 1 Cap by mouth two (2) times a day. Plan:   Vivian Arguello is a pleasant gentleman with symptomatic PAF that required ER presentation and cardioversion. He has mild LAE. We discussed continuing med rx - oac and bb versus ablation. Vivian Arguello is a candidate for an afib ablation. I discussed the risks/benefits/alternatives of the procedure with the patient. Risks include (but are not limited to) bleeding, heart block, infection, cva/mi/tamponade/esophageal perforation/pv stenosis/death. The patient understands that there is a 8-0% major complication rate and would like to monitor his AF burden with a 2 week event monitor before making a decision. I conveyed to him that stopping smoking and etoh would be of greatest benefit. He will f/u post monitoring. Thank you for this interesting consultation. Continue medical management for AF, htn, etoh and tobacco use. Thank you for allowing me to participate in Vivian Arguello 's care.     Luis Siddiqui MD, Darryl Abarca

## 2020-08-11 NOTE — PROGRESS NOTES
Patient received a 2 week event monitor. Instructions given verbally as well as an instruction sheet. Pt verbalized understanding.     [facility] Event Monitoring

## 2020-09-10 ENCOUNTER — OFFICE VISIT (OUTPATIENT)
Dept: CARDIOLOGY CLINIC | Age: 75
End: 2020-09-10
Payer: MEDICARE

## 2020-09-10 VITALS
OXYGEN SATURATION: 98 % | WEIGHT: 179.8 LBS | HEART RATE: 69 BPM | BODY MASS INDEX: 25.17 KG/M2 | DIASTOLIC BLOOD PRESSURE: 82 MMHG | RESPIRATION RATE: 18 BRPM | SYSTOLIC BLOOD PRESSURE: 132 MMHG | HEIGHT: 71 IN

## 2020-09-10 DIAGNOSIS — Z72.0 TOBACCO ABUSE: ICD-10-CM

## 2020-09-10 DIAGNOSIS — F10.10 ETOH ABUSE: ICD-10-CM

## 2020-09-10 DIAGNOSIS — I10 ESSENTIAL HYPERTENSION: ICD-10-CM

## 2020-09-10 DIAGNOSIS — I48.0 PAROXYSMAL ATRIAL FIBRILLATION (HCC): ICD-10-CM

## 2020-09-10 DIAGNOSIS — Z86.79 HISTORY OF IRREGULAR HEARTBEAT: Primary | ICD-10-CM

## 2020-09-10 PROCEDURE — G8510 SCR DEP NEG, NO PLAN REQD: HCPCS | Performed by: INTERNAL MEDICINE

## 2020-09-10 PROCEDURE — G8419 CALC BMI OUT NRM PARAM NOF/U: HCPCS | Performed by: INTERNAL MEDICINE

## 2020-09-10 PROCEDURE — G8427 DOCREV CUR MEDS BY ELIG CLIN: HCPCS | Performed by: INTERNAL MEDICINE

## 2020-09-10 PROCEDURE — 1101F PT FALLS ASSESS-DOCD LE1/YR: CPT | Performed by: INTERNAL MEDICINE

## 2020-09-10 PROCEDURE — 93005 ELECTROCARDIOGRAM TRACING: CPT | Performed by: INTERNAL MEDICINE

## 2020-09-10 PROCEDURE — G0463 HOSPITAL OUTPT CLINIC VISIT: HCPCS | Performed by: INTERNAL MEDICINE

## 2020-09-10 PROCEDURE — 99213 OFFICE O/P EST LOW 20 MIN: CPT | Performed by: INTERNAL MEDICINE

## 2020-09-10 PROCEDURE — 93010 ELECTROCARDIOGRAM REPORT: CPT | Performed by: INTERNAL MEDICINE

## 2020-09-10 PROCEDURE — G8536 NO DOC ELDER MAL SCRN: HCPCS | Performed by: INTERNAL MEDICINE

## 2020-09-10 PROCEDURE — 3017F COLORECTAL CA SCREEN DOC REV: CPT | Performed by: INTERNAL MEDICINE

## 2020-09-10 NOTE — PROGRESS NOTES
1. Have you been to the ER, urgent care clinic since your last visit? Hospitalized since your last visit? [id number] [facility] admit 8- a fib. 2. Have you seen or consulted any other health care providers outside of the [address] [place] [address] since your last visit? Include any pap smears or colon screening. No    Chief Complaint   Patient presents with    Results     Discuss EM results. Denied cardiac symptoms.

## 2020-09-10 NOTE — PROGRESS NOTES
Subjective:      Melina Dowd is a 69 y.o. male is here for EP consult. The patient denies chest pain/ shortness of breath, orthopnea, PND, LE edema, palpitations, syncope, presyncope or fatigue. Patient Active Problem List    Diagnosis Date Noted    Atrial fibrillation ([facility] [address] 07/15/2020    Bilateral carotid artery stenosis 07/15/2020      Morgan Brown MD  Past Medical History:   Diagnosis Date    Arthritis     Atrial fibrillation ([facility] [address]     Hypertension     Ill-defined condition     glaucoma-left eye      History reviewed. No pertinent surgical history. No Known Allergies   History reviewed. No pertinent family history. negative for cardiac disease  Social History     Socioeconomic History    Marital status:      Spouse name: Not on file    Number of children: Not on file    Years of education: Not on file    Highest education level: Not on file   Tobacco Use    Smoking status: Current Every Day Smoker     Packs/day: 1.00     Years: 20.00     Pack years: 20.00    Smokeless tobacco: Never Used   Substance and Sexual Activity    Alcohol use: Yes     Comment: occasional    Drug use: Not Currently     Current Outpatient Medications   Medication Sig    acyclovir (ZOVIRAX) 400 mg tablet Take 400 mg by mouth daily.  dorzolamide-timoloL (COSOPT) 22.3-6.8 mg/mL ophthalmic solution Administer 1 Drop to left eye two (2) times a day.  latanoprost (XALATAN) 0.005 % ophthalmic solution Administer 1 Drop to left eye nightly.  vit C/E/Zn/coppr/lutein/zeaxan (PRESERVISION AREDS-2 PO) Take 1 Cap by mouth two (2) times a day.  apixaban (ELIQUIS) 5 mg tablet Take 1 Tab by mouth two (2) times a day. Indications: treatment to prevent a blood clot in the lung    dilTIAZem ER (CARDIZEM CD) 120 mg capsule Take 1 Cap by mouth daily.  metoprolol tartrate (LOPRESSOR) 25 mg tablet Take 1 Tab by mouth every twelve (12) hours.     thiamine mononitrate (B-1) 100 mg tablet Take 1 Tab by mouth daily.  therapeutic multivitamin (THERAGRAN) tablet Take 1 Tab by mouth daily.  hydrOXYchloroQUINE (PLAQUENIL) 200 mg tablet Take 400 mg by mouth daily.  glucosamine-chondroitin (ARTHX) 500-400 mg cap Take 1 Cap by mouth daily. No current facility-administered medications for this visit. Vitals:    09/10/20 1059   BP: 132/82   Pulse: 69   Resp: 18   SpO2: 98%   Weight: 179 lb 12.8 oz (81.6 kg)   Height: 5' 11\" (1.803 m)       I have reviewed the nurses notes, vitals, problem list, allergy list, medical history, family, social history and medications. Review of Symptoms:    General: Pt denies excessive weight gain or loss. Pt is able to conduct ADL's  HEENT: Denies blurred vision, headaches, hearing loss, epistaxis and difficulty swallowing. Respiratory: Denies cough, congestion, shortness of breath, ROBERT, wheezing or stridor. Cardiovascular: Denies precordial pain, palpitations, edema or PND  Gastrointestinal: Denies poor appetite, indigestion, abdominal pain or blood in stool  Genitourinary: Denies hematuria, dysuria, increased urinary frequency  Musculoskeletal: Denies joint pain or swelling from muscles or joints  Neurologic: Denies tremor, paresthesias, headache, or sensory motor disturbance  Psychiatric: Denies confusion, insomnia, depression  Integumentray: Denies rash, itching or ulcers. Hematologic: Denies easy bruising, bleeding    Physical Exam:      General: Well developed, in no acute distress. HEENT: Eyes - PERRL, no jvd  Heart:  Normal S1/S2 negative S3 or S4. Regular, no murmur, gallop or rub. Respiratory: Clear bilaterally x 4, no wheezing or rales  Abdomen:   Soft, non-tender, bowel sounds are active. Extremities:  No edema, normal cap refill, no cyanosis. Musculoskeletal: No clubbing  Neuro: A&Ox3, speech clear, gait stable. Skin: Skin color is normal. No rashes or lesions.  Non diaphoretic, no ulcers or subcutaneous nodule  Vascular: 2+ pulses symmetric in all extremities  Psych - judgement intact and orientation is wnl     Cardiographics    Ekg: nsr    Monitor - PAF, PSVT    Results for orders placed or performed during the hospital encounter of 07/14/20   EKG, 12 LEAD, INITIAL   Result Value Ref Range    Ventricular Rate 75 BPM    Atrial Rate 234 BPM    QRS Duration 70 ms    Q-T Interval 408 ms    QTC Calculation (Bezet) 455 ms    Calculated R Axis -18 degrees    Calculated T Axis -20 degrees    Diagnosis       Atrial fibrillation  Minimal voltage criteria for LVH, may be normal variant    Confirmed by Quyen Macdonald ([id number]) on 7/16/2020 8:51:03 AM           Lab Results   Component Value Date/Time    WBC 6.2 07/16/2020 05:15 AM    HGB 13.4 07/16/2020 05:15 AM    HCT 38.6 07/16/2020 05:15 AM    PLATELET 383 89/67/1856 05:15 AM    MCV 94.1 07/16/2020 05:15 AM      Lab Results   Component Value Date/Time    Sodium 140 07/16/2020 05:15 AM    Potassium 3.8 07/16/2020 05:15 AM    Chloride 110 (H) 07/16/2020 05:15 AM    CO2 25 07/16/2020 05:15 AM    Anion gap 5 07/16/2020 05:15 AM    Glucose 96 07/16/2020 05:15 AM    BUN 9 07/16/2020 05:15 AM    Creatinine 0.73 07/16/2020 05:15 AM    BUN/Creatinine ratio 12 07/16/2020 05:15 AM    GFR est AA >60 07/16/2020 05:15 AM    GFR est non-AA >60 07/16/2020 05:15 AM    Calcium 8.3 (L) 07/16/2020 05:15 AM    Bilirubin, total 0.5 07/16/2020 05:15 AM    Alk. phosphatase 80 07/16/2020 05:15 AM    Protein, total 6.2 (L) 07/16/2020 05:15 AM    Albumin 3.0 (L) 07/16/2020 05:15 AM    Globulin 3.2 07/16/2020 05:15 AM    A-G Ratio 0.9 (L) 07/16/2020 05:15 AM    ALT (SGPT) 19 07/16/2020 05:15 AM         Assessment:     Assessment:        ICD-10-CM ICD-9-CM    1. History of irregular heartbeat  Z86.79 V12.59 AMB POC EKG ROUTINE W/ 12 LEADS, INTER & REP   2. Paroxysmal atrial fibrillation (HCC)  I48.0 427.31    3. Essential hypertension  I10 401.9    4. ETOH abuse  F10.10 305.00    5.  Tobacco abuse  Z72.0 305.1      Orders Placed This Encounter  AMB POC EKG ROUTINE W/ 12 LEADS, INTER & REP     Order Specific Question:   Reason for Exam:     Answer:   routine        Plan:   Tariq Lama is continuing to have PAF noted on his monitor. He is on oac. He continues to smoke and to drink 4 drinks a day. He is not interested in changing his habits. Cont med rx for htn and f/u prn. Continue medical management for AF, htn. Thank you for allowing me to participate in Tariq Lama 's care.     Hodan Newell MD, Chris Lewis

## 2020-09-10 NOTE — Clinical Note
9/10/20 Patient: Brenna Stinson YOB: 1945 Date of Visit: 9/10/2020 Jesse Garber MD 
VIA Dear Jesse Garber MD, Thank you for referring Mr. Brenna Stinson to [facility] for evaluation. My notes for this consultation are attached. If you have questions, please do not hesitate to call me. I look forward to following your patient along with you. Sincerely, Columba Taylor MD

## 2021-01-19 ENCOUNTER — TELEPHONE (OUTPATIENT)
Dept: CARDIOLOGY CLINIC | Age: 76
End: 2021-01-19

## 2021-01-19 NOTE — TELEPHONE ENCOUNTER
Nidhi Weeks, Jessica Robison, LPN   Caller: Unspecified (Today,  2:30 PM)             His current dose is not theraeputic. Dosing is based on 3 factors - age (>80), weight (<62 kg) and renal status (cr >1.5).  you must me 2/3 of those to be on lower dose. He is does not.    I recommend he continue the 5 mg bid and follow up with ENT re: bleeding

## 2021-01-19 NOTE — TELEPHONE ENCOUNTER
Verified patient with 2 identifiers   Patient advised of Alida FLORES recommendation to continue the eliquis twice daily, that the 5 mg daily is not therapeutic. Patient verified understanding.

## 2021-01-19 NOTE — TELEPHONE ENCOUNTER
Verified patient with 2 identifiers   Patient states he has some bleeding episodes and is on eliquis. In October 2020 he saw his urologist and hematuria was detected. January 8,2021 patient reports he had a severe nose bleed, on and off for 2 days. He has since decreased his eliquis dose from 5 mg twice daily to 5 mg daily. Patient questioning is this is ok and if it is should he take 2.5 mg twice daily or is 5 mg once daily ok?

## 2021-01-19 NOTE — TELEPHONE ENCOUNTER
Please call patient regarding eliquis he has questions about medication      Patient has had a couple of episodes of bleeding and wants to know if he can cut back on the dosage. [phone].     Thanks  Glenda Garcia

## 2021-05-12 ENCOUNTER — TELEPHONE (OUTPATIENT)
Dept: CARDIOLOGY CLINIC | Age: 76
End: 2021-05-12

## 2021-05-12 NOTE — TELEPHONE ENCOUNTER
Pt needs refill on Diltiazen ER- uses [facility] [address]- call wife at mobile number if needed    Thanks  gerry

## 2021-05-12 NOTE — TELEPHONE ENCOUNTER
Spoke with Gauri Bearden at [facility] [employer] Patient still has 2 more refills left on file  Gauri Bearden states she will get that ready for patient. Patients wife Saul S [place] - on [address] - informed.

## 2021-07-06 RX ORDER — DILTIAZEM HYDROCHLORIDE 120 MG/1
120 CAPSULE, COATED, EXTENDED RELEASE ORAL DAILY
Qty: 30 CAPSULE | Refills: 11 | Status: SHIPPED | OUTPATIENT
Start: 2021-07-06

## 2021-07-06 RX ORDER — METOPROLOL TARTRATE 25 MG/1
25 TABLET, FILM COATED ORAL EVERY 12 HOURS
Qty: 60 TABLET | Refills: 11 | Status: SHIPPED | OUTPATIENT
Start: 2021-07-06

## 2021-07-06 NOTE — TELEPHONE ENCOUNTER
Patient need a 90 day supply on each refill for : apixaban (ELIQUIS) 5 mg tablet   metoprolol tartrate (LOPRESSOR) 25 mg tablet     dilTIAZem ER (CARDIZEM CD) 120 mg capsule   To be sent to [facility] on file. Please call.      Thanks

## 2021-08-18 DIAGNOSIS — Z86.79 HISTORY OF IRREGULAR HEARTBEAT: Primary | ICD-10-CM

## 2021-08-18 DIAGNOSIS — I10 ESSENTIAL HYPERTENSION: ICD-10-CM

## 2021-08-18 DIAGNOSIS — I48.0 PAROXYSMAL ATRIAL FIBRILLATION (HCC): ICD-10-CM

## 2021-08-18 DIAGNOSIS — Z72.0 TOBACCO ABUSE: ICD-10-CM

## 2021-08-18 DIAGNOSIS — F10.10 ETOH ABUSE: ICD-10-CM

## 2021-09-02 ENCOUNTER — CLINICAL SUPPORT (OUTPATIENT)
Dept: CARDIOLOGY CLINIC | Age: 76
End: 2021-09-02
Payer: MEDICARE

## 2021-09-02 DIAGNOSIS — I48.0 PAROXYSMAL ATRIAL FIBRILLATION (HCC): Primary | ICD-10-CM

## 2021-09-02 PROCEDURE — 93228 REMOTE 30 DAY ECG REV/REPORT: CPT | Performed by: INTERNAL MEDICINE

## 2021-09-02 NOTE — PROGRESS NOTES
Patient received a 30 day event monitor. Instructions given verbally as well as an instruction sheet. Pt verbalized understanding.     [facility] Event Monitoring

## 2021-10-07 ENCOUNTER — OFFICE VISIT (OUTPATIENT)
Dept: CARDIOLOGY CLINIC | Age: 76
End: 2021-10-07
Payer: MEDICARE

## 2021-10-07 VITALS
RESPIRATION RATE: 18 BRPM | HEART RATE: 85 BPM | OXYGEN SATURATION: 98 % | WEIGHT: 183.4 LBS | HEIGHT: 71 IN | SYSTOLIC BLOOD PRESSURE: 140 MMHG | BODY MASS INDEX: 25.68 KG/M2 | DIASTOLIC BLOOD PRESSURE: 98 MMHG

## 2021-10-07 DIAGNOSIS — I10 PRIMARY HYPERTENSION: ICD-10-CM

## 2021-10-07 DIAGNOSIS — I47.1 SVT (SUPRAVENTRICULAR TACHYCARDIA) (HCC): ICD-10-CM

## 2021-10-07 DIAGNOSIS — I48.0 PAROXYSMAL ATRIAL FIBRILLATION (HCC): Primary | ICD-10-CM

## 2021-10-07 PROCEDURE — 1101F PT FALLS ASSESS-DOCD LE1/YR: CPT | Performed by: INTERNAL MEDICINE

## 2021-10-07 PROCEDURE — G8427 DOCREV CUR MEDS BY ELIG CLIN: HCPCS | Performed by: INTERNAL MEDICINE

## 2021-10-07 PROCEDURE — G8536 NO DOC ELDER MAL SCRN: HCPCS | Performed by: INTERNAL MEDICINE

## 2021-10-07 PROCEDURE — 93010 ELECTROCARDIOGRAM REPORT: CPT | Performed by: INTERNAL MEDICINE

## 2021-10-07 PROCEDURE — G8419 CALC BMI OUT NRM PARAM NOF/U: HCPCS | Performed by: INTERNAL MEDICINE

## 2021-10-07 PROCEDURE — G0463 HOSPITAL OUTPT CLINIC VISIT: HCPCS | Performed by: INTERNAL MEDICINE

## 2021-10-07 PROCEDURE — 3017F COLORECTAL CA SCREEN DOC REV: CPT | Performed by: INTERNAL MEDICINE

## 2021-10-07 PROCEDURE — G8432 DEP SCR NOT DOC, RNG: HCPCS | Performed by: INTERNAL MEDICINE

## 2021-10-07 PROCEDURE — 99214 OFFICE O/P EST MOD 30 MIN: CPT | Performed by: INTERNAL MEDICINE

## 2021-10-07 PROCEDURE — 93005 ELECTROCARDIOGRAM TRACING: CPT | Performed by: INTERNAL MEDICINE

## 2021-10-07 NOTE — PROGRESS NOTES
Subjective:      Cora Lubin is a 77 y.o. male is here for EP consult. Here for a monitor that demonstrated PSVT. The patient denies chest pain/ shortness of breath, orthopnea, PND, LE edema, palpitations, syncope, presyncope or fatigue. Patient Active Problem List    Diagnosis Date Noted    Atrial fibrillation ([facility] 07/15/2020    Bilateral carotid artery stenosis 07/15/2020      Ryan Cavanaugh MD  Past Medical History:   Diagnosis Date    Arthritis     Atrial fibrillation ([facility]     Cancer [facility]) 2015    prostate -    Glaucoma 2013    left eye- surgery 2014    Hypertension     Ill-defined condition     glaucoma-left eye    Rheumatoid arthritis ([facility] 2014    Thyroid disease       History reviewed. No pertinent surgical history. No Known Allergies   History reviewed. No pertinent family history. negative for cardiac disease  Social History     Socioeconomic History    Marital status:      Spouse name: Not on file    Number of children: Not on file    Years of education: Not on file    Highest education level: Not on file   Tobacco Use    Smoking status: Current Every Day Smoker     Packs/day: 1.00     Years: 29.00     Pack years: 29.00    Smokeless tobacco: Never Used   Vaping Use    Vaping Use: Never used   Substance and Sexual Activity    Alcohol use: Yes     Comment: occasional    Drug use: Not Currently     Social Determinants of Health     Financial Resource Strain:     Difficulty of Paying Living Expenses:    Food Insecurity:     Worried About Running Out of Food in the Last Year:     Ran Out of Food in the Last Year:    Transportation Needs:     Lack of Transportation (Medical):      Lack of Transportation (Non-Medical):    Physical Activity:     Days of Exercise per Week:     Minutes of Exercise per Session:    Stress:     Feeling of Stress :    Social Connections:     Frequency of Communication with Friends and Family:     Frequency of Social Gatherings with Friends and Family:     Attends Bahai Services:     Active Member of Clubs or Organizations:     Attends Club or Organization Meetings:     Marital Status:      Current Outpatient Medications   Medication Sig    dilTIAZem ER (CARDIZEM CD) 120 mg capsule Take 1 Capsule by mouth daily. (Patient taking differently: Take 120 mg by mouth every other day.)    metoprolol tartrate (LOPRESSOR) 25 mg tablet Take 1 Tablet by mouth every twelve (12) hours.  acyclovir (ZOVIRAX) 400 mg tablet Take 400 mg by mouth daily.  dorzolamide-timoloL (COSOPT) 22.3-6.8 mg/mL ophthalmic solution Administer 1 Drop to left eye two (2) times a day.  latanoprost (XALATAN) 0.005 % ophthalmic solution Administer 1 Drop to left eye nightly.  vit C/E/Zn/coppr/lutein/zeaxan (PRESERVISION AREDS-2 PO) Take 1 Cap by mouth two (2) times a day.  therapeutic multivitamin (THERAGRAN) tablet Take 1 Tab by mouth daily.  hydrOXYchloroQUINE (PLAQUENIL) 200 mg tablet Take 400 mg by mouth daily.  glucosamine-chondroitin (ARTHX) 500-400 mg cap Take 1 Cap by mouth daily.  thiamine mononitrate (B-1) 100 mg tablet Take 1 Tab by mouth daily. (Patient not taking: Reported on 10/7/2021)     No current facility-administered medications for this visit. Vitals:    10/07/21 1032 10/07/21 1033   BP: (!) 140/100 (!) 140/98   Pulse: 85    Resp: 18    SpO2: 98%    Weight: 183 lb 6.4 oz (83.2 kg)    Height: 5' 11\" (1.803 m)        I have reviewed the nurses notes, vitals, problem list, allergy list, medical history, family, social history and medications. Review of Symptoms:    General: Pt denies excessive weight gain or loss. Pt is able to conduct ADL's  HEENT: Denies blurred vision, headaches, hearing loss, epistaxis and difficulty swallowing. Respiratory: Denies cough, congestion, shortness of breath, ROBERT, wheezing or stridor.   Cardiovascular: Denies precordial pain, palpitations, edema or PND  Gastrointestinal: Denies poor appetite, indigestion, abdominal pain or blood in stool  Genitourinary: Denies hematuria, dysuria, increased urinary frequency  Musculoskeletal: Denies joint pain or swelling from muscles or joints  Neurologic: Denies tremor, paresthesias, headache, or sensory motor disturbance  Psychiatric: Denies confusion, insomnia, depression  Integumentray: Denies rash, itching or ulcers. Hematologic: Denies easy bruising, bleeding    Physical Exam:      General: Well developed, in no acute distress. HEENT: Eyes - PERRL, no jvd  Heart:  Normal S1/S2 negative S3 or S4. Regular, no murmur, gallop or rub. Respiratory: Clear bilaterally x 4, no wheezing or rales  Abdomen:   Soft, non-tender, bowel sounds are active. Extremities:  No edema, normal cap refill, no cyanosis. Musculoskeletal: No clubbing  Neuro: A&Ox3, speech clear, gait stable. Skin: Skin color is normal. No rashes or lesions.  Non diaphoretic, no ulcers or subcutaneous nodule  Vascular: 2+ pulses symmetric in all extremities  Psych - judgement intact and orientation is wnl     Cardiographics    Ekg: nsr    Monitor - PSVT 163 bpm      Results for orders placed or performed during the hospital encounter of 07/14/20   EKG, 12 LEAD, INITIAL   Result Value Ref Range    Ventricular Rate 75 BPM    Atrial Rate 234 BPM    QRS Duration 70 ms    Q-T Interval 408 ms    QTC Calculation (Bezet) 455 ms    Calculated R Axis -18 degrees    Calculated T Axis -20 degrees    Diagnosis       Atrial fibrillation  Minimal voltage criteria for LVH, may be normal variant    Confirmed by Adrianne Reid ([id number]) on 7/16/2020 8:51:03 AM           Lab Results   Component Value Date/Time    WBC 6.2 07/16/2020 05:15 AM    HGB 13.4 07/16/2020 05:15 AM    HCT 38.6 07/16/2020 05:15 AM    PLATELET 997 41/53/9574 05:15 AM    MCV 94.1 07/16/2020 05:15 AM      Lab Results   Component Value Date/Time    Sodium 140 07/16/2020 05:15 AM    Potassium 3.8 07/16/2020 05:15 AM    Chloride 110 (H) 07/16/2020 05:15 AM    CO2 25 07/16/2020 05:15 AM    Anion gap 5 07/16/2020 05:15 AM    Glucose 96 07/16/2020 05:15 AM    BUN 9 07/16/2020 05:15 AM    Creatinine 0.73 07/16/2020 05:15 AM    BUN/Creatinine ratio 12 07/16/2020 05:15 AM    GFR est AA >60 07/16/2020 05:15 AM    GFR est non-AA >60 07/16/2020 05:15 AM    Calcium 8.3 (L) 07/16/2020 05:15 AM    Bilirubin, total 0.5 07/16/2020 05:15 AM    Alk. phosphatase 80 07/16/2020 05:15 AM    Protein, total 6.2 (L) 07/16/2020 05:15 AM    Albumin 3.0 (L) 07/16/2020 05:15 AM    Globulin 3.2 07/16/2020 05:15 AM    A-G Ratio 0.9 (L) 07/16/2020 05:15 AM    ALT (SGPT) 19 07/16/2020 05:15 AM         Assessment:     Assessment:        ICD-10-CM ICD-9-CM    1. Paroxysmal atrial fibrillation (HCC)  I48.0 427.31 AMB POC EKG ROUTINE W/ 12 LEADS, INTER & REP   2. SVT (supraventricular tachycardia) (HCC)  I47.1 427.89    3. Primary hypertension  I10 401.9      Orders Placed This Encounter    AMB POC EKG ROUTINE W/ 12 LEADS, INTER & REP     Order Specific Question:   Reason for Exam:     Answer:   routine        Plan:   Alexandro Ng is following up for PSVT noted on an event monitor. He denies any symptoms. He has decreased his oac to once daily on his own secondary to cost. I explained to him that once a day dosing for eliquis was of no use. I offered to discuss watchman with him but he declined. He is also taking his dilt once every other day per his own discretion. I explained to him that he should take that once daily as prescribed for his htn. He continues to smoke and drink etoh daily and is not interested in changing his habits. He understands that secondary to his hx of paf and his elevated chadsvasc score, he is at risk for cva. I will dc his eliquis since he refuses to take it as prescribed. He can f/u on a prn basis. I do not see the point of ordering any further monitors.     Thank you for allowing me to participate in Alexandro Dillard. 's care.    Venetta Spatz, MD, Ely Carrel    On this date 10/07/2021 I have spent 30 minutes reviewing previous notes, test results and face to face with the patient discussing the diagnosis and importance of compliance with the treatment plan as well as documenting on the day of the visit.

## 2021-10-07 NOTE — LETTER
10/7/2021    Patient: Dominic Baker. YOB: 1945   Date of Visit: 10/7/2021     Bing Jacobs MD  [address] [facility]  [address] [address]  Via Fax: [phone]    Dear Bing Jacobs MD,      Thank you for referring Mr. Zeke Mayfield to [address] [place] [address] for evaluation. My notes for this consultation are attached. If you have questions, please do not hesitate to call me. I look forward to following your patient along with you.       Sincerely,    Yeimy Pena MD

## 2021-10-07 NOTE — PROGRESS NOTES
Chief Complaint   Patient presents with    Irregular Heart Beat     Annual follow up - denies cardiac sx     Results     event monitor      1. Have you been to the ER, urgent care clinic since your last visit? Hospitalized since your last visit? No     2. Have you seen or consulted any other health care providers outside of the [address] [place] [address] since your last visit? Include any pap smears or colon screening.   Yes [facility]    Patient is getting and 2 inj every four weeks for RA

## 2022-03-10 ENCOUNTER — TRANSCRIBE ORDER (OUTPATIENT)
Dept: SCHEDULING | Age: 77
End: 2022-03-10

## 2022-03-10 DIAGNOSIS — F17.200 CURRENT SMOKER: Primary | ICD-10-CM

## 2022-03-11 ENCOUNTER — TRANSCRIBE ORDER (OUTPATIENT)
Dept: SCHEDULING | Age: 77
End: 2022-03-11

## 2022-03-11 DIAGNOSIS — F17.200 CURRENT SMOKER: Primary | ICD-10-CM

## 2022-03-14 ENCOUNTER — TRANSCRIBE ORDER (OUTPATIENT)
Dept: SCHEDULING | Age: 77
End: 2022-03-14

## 2022-03-19 PROBLEM — I65.23 BILATERAL CAROTID ARTERY STENOSIS: Status: ACTIVE | Noted: 2020-07-15

## 2022-03-19 PROBLEM — I48.91 ATRIAL FIBRILLATION (HCC): Status: ACTIVE | Noted: 2020-07-15

## 2022-03-21 ENCOUNTER — HOSPITAL ENCOUNTER (OUTPATIENT)
Dept: CT IMAGING | Age: 77
Discharge: HOME | End: 2022-03-21
Attending: FAMILY MEDICINE
Payer: MEDICARE

## 2022-03-21 DIAGNOSIS — F17.200 CURRENT SMOKER: ICD-10-CM

## 2022-03-21 PROCEDURE — 71271 CT THORAX LUNG CANCER SCR C-: CPT

## 2023-03-27 ENCOUNTER — TRANSCRIBE ORDER (OUTPATIENT)
Dept: SCHEDULING | Age: 78
End: 2023-03-27

## 2023-03-27 DIAGNOSIS — R10.12 LUQ ABDOMINAL PAIN: Primary | ICD-10-CM

## 2023-04-23 DIAGNOSIS — R10.12 LUQ ABDOMINAL PAIN: Primary | ICD-10-CM
